# Patient Record
Sex: MALE | Race: BLACK OR AFRICAN AMERICAN | NOT HISPANIC OR LATINO | RURAL
[De-identification: names, ages, dates, MRNs, and addresses within clinical notes are randomized per-mention and may not be internally consistent; named-entity substitution may affect disease eponyms.]

---

## 2020-03-12 ENCOUNTER — HISTORICAL (OUTPATIENT)
Dept: ADMINISTRATIVE | Facility: HOSPITAL | Age: 45
End: 2020-03-12

## 2021-04-20 ENCOUNTER — HOSPITAL ENCOUNTER (INPATIENT)
Facility: HOSPITAL | Age: 46
LOS: 2 days | Discharge: HOME | DRG: 287 | End: 2021-04-22
Attending: EMERGENCY MEDICINE | Admitting: HOSPITALIST

## 2021-04-20 DIAGNOSIS — I50.9 NEW ONSET OF CONGESTIVE HEART FAILURE: ICD-10-CM

## 2021-04-20 DIAGNOSIS — I10 HYPERTENSION: ICD-10-CM

## 2021-04-20 DIAGNOSIS — I21.4 NSTEMI (NON-ST ELEVATED MYOCARDIAL INFARCTION): ICD-10-CM

## 2021-04-20 DIAGNOSIS — I50.9 CHF (CONGESTIVE HEART FAILURE): ICD-10-CM

## 2021-04-20 DIAGNOSIS — I10 UNCONTROLLED HYPERTENSION: ICD-10-CM

## 2021-04-20 DIAGNOSIS — I51.7 CARDIOMEGALY: ICD-10-CM

## 2021-04-20 DIAGNOSIS — R07.9 CHEST PAIN: ICD-10-CM

## 2021-04-20 DIAGNOSIS — R06.02 SHORTNESS OF BREATH: ICD-10-CM

## 2021-04-20 DIAGNOSIS — I50.9 ACUTE CONGESTIVE HEART FAILURE, UNSPECIFIED HEART FAILURE TYPE: Primary | ICD-10-CM

## 2021-04-20 LAB
ALBUMIN SERPL BCP-MCNC: 2.9 G/DL (ref 3.5–5)
ALBUMIN/GLOB SERPL: 0.7 {RATIO}
ALP SERPL-CCNC: 82 U/L (ref 45–115)
ALT SERPL W P-5'-P-CCNC: 51 U/L (ref 16–61)
AMPHET UR QL SCN: NEGATIVE
ANION GAP SERPL CALCULATED.3IONS-SCNC: 13 MMOL/L (ref 7–16)
AST SERPL W P-5'-P-CCNC: 31 U/L (ref 15–37)
BARBITURATES UR QL SCN: NEGATIVE
BASOPHILS # BLD AUTO: 0.12 K/UL (ref 0–0.2)
BASOPHILS NFR BLD AUTO: 0.9 % (ref 0–1)
BENZODIAZ METAB UR QL SCN: NEGATIVE
BILIRUB SERPL-MCNC: 0.5 MG/DL (ref 0–1.2)
BILIRUB UR QL STRIP: NEGATIVE
BUN SERPL-MCNC: 11 MG/DL (ref 7–18)
BUN/CREAT SERPL: 10 (ref 6–20)
CALCIUM SERPL-MCNC: 8.6 MG/DL (ref 8.5–10.1)
CANNABINOIDS UR QL SCN: POSITIVE
CHLORIDE SERPL-SCNC: 106 MMOL/L (ref 98–107)
CK MB SERPL-MCNC: 2.7 NG/ML (ref 1–3.6)
CK MB SERPL-MCNC: 2.9 NG/ML (ref 1–3.6)
CLARITY UR: CLEAR
CO2 SERPL-SCNC: 28 MMOL/L (ref 21–32)
COCAINE UR QL SCN: NEGATIVE
COLOR UR: ABNORMAL
CREAT SERPL-MCNC: 1.07 MG/DL (ref 0.7–1.3)
D DIMER PPP FEU-MCNC: 0.4 ΜG/ML (ref 0–0.47)
DIFFERENTIAL METHOD BLD: ABNORMAL
EOSINOPHIL # BLD AUTO: 0.17 K/UL (ref 0–0.5)
EOSINOPHIL NFR BLD AUTO: 1.3 % (ref 1–4)
ERYTHROCYTE [DISTWIDTH] IN BLOOD BY AUTOMATED COUNT: 14.4 % (ref 11.5–14.5)
FLUAV AG UPPER RESP QL IA.RAPID: NEGATIVE
FLUBV AG UPPER RESP QL IA.RAPID: NEGATIVE
GLOBULIN SER-MCNC: 4.2 G/DL (ref 2–4)
GLUCOSE SERPL-MCNC: 99 MG/DL (ref 74–106)
GLUCOSE UR STRIP-MCNC: NEGATIVE MG/DL
HCT VFR BLD AUTO: 37.2 % (ref 40–54)
HGB BLD-MCNC: 11.9 G/DL (ref 13.5–18)
IMM GRANULOCYTES # BLD AUTO: 0.06 K/UL (ref 0–0.04)
IMM GRANULOCYTES NFR BLD: 0.4 % (ref 0–0.4)
KETONES UR STRIP-SCNC: NEGATIVE MG/DL
LEUKOCYTE ESTERASE UR QL STRIP: NEGATIVE
LYMPHOCYTES # BLD AUTO: 2.18 K/UL (ref 1–4.8)
LYMPHOCYTES NFR BLD AUTO: 16.1 % (ref 27–41)
MCH RBC QN AUTO: 28.5 PG (ref 27–31)
MCHC RBC AUTO-ENTMCNC: 32 G/DL (ref 32–36)
MCV RBC AUTO: 89.2 FL (ref 80–96)
MONOCYTES # BLD AUTO: 1.16 K/UL (ref 0–0.8)
MONOCYTES NFR BLD AUTO: 8.6 % (ref 2–6)
MPC BLD CALC-MCNC: 11.8 FL (ref 9.4–12.4)
NEUTROPHILS # BLD AUTO: 9.84 K/UL (ref 1.8–7.7)
NEUTROPHILS NFR BLD AUTO: 72.7 % (ref 53–65)
NITRITE UR QL STRIP: NEGATIVE
NRBC # BLD AUTO: 0 X10E3/UL
NRBC, AUTO (.00): 0 %
NT-PROBNP SERPL-MCNC: 2953 PG/ML (ref 1–125)
OPIATES UR QL SCN: NEGATIVE
PCP UR QL SCN: NEGATIVE
PH UR STRIP: 7 PH UNITS
PLATELET # BLD AUTO: 329 K/UL (ref 150–400)
POTASSIUM SERPL-SCNC: 4.2 MMOL/L (ref 3.5–5.1)
PROT SERPL-MCNC: 7.1 G/DL (ref 6.4–8.2)
PROT UR QL STRIP: NEGATIVE
RBC # BLD AUTO: 4.17 M/UL (ref 4.6–6.2)
RBC # UR STRIP: ABNORMAL /UL
SARS-COV+SARS-COV-2 AG RESP QL IA.RAPID: NEGATIVE
SODIUM SERPL-SCNC: 143 MMOL/L (ref 136–145)
SP GR UR STRIP: 1.01
TROPONIN I SERPL-MCNC: 0.06 NG/ML
TROPONIN I SERPL-MCNC: 0.06 NG/ML
TROPONIN I SERPL-MCNC: 0.07 NG/ML
UROBILINOGEN UR STRIP-ACNC: 0.2 MG/DL
WBC # BLD AUTO: 13.53 K/UL (ref 4.5–11)

## 2021-04-20 PROCEDURE — 93010 ELECTROCARDIOGRAM REPORT: CPT | Mod: 76,,, | Performed by: INTERNAL MEDICINE

## 2021-04-20 PROCEDURE — 99285 EMERGENCY DEPT VISIT HI MDM: CPT | Mod: ,,, | Performed by: EMERGENCY MEDICINE

## 2021-04-20 PROCEDURE — 93010 EKG 12-LEAD: ICD-10-PCS | Mod: ,,, | Performed by: INTERNAL MEDICINE

## 2021-04-20 PROCEDURE — 63600175 PHARM REV CODE 636 W HCPCS: Performed by: EMERGENCY MEDICINE

## 2021-04-20 PROCEDURE — 96374 THER/PROPH/DIAG INJ IV PUSH: CPT

## 2021-04-20 PROCEDURE — 25000003 PHARM REV CODE 250: Performed by: NURSE PRACTITIONER

## 2021-04-20 PROCEDURE — 85025 COMPLETE CBC W/AUTO DIFF WBC: CPT | Performed by: EMERGENCY MEDICINE

## 2021-04-20 PROCEDURE — 93010 ELECTROCARDIOGRAM REPORT: CPT | Mod: ,,, | Performed by: INTERNAL MEDICINE

## 2021-04-20 PROCEDURE — 87428 SARSCOV & INF VIR A&B AG IA: CPT | Performed by: EMERGENCY MEDICINE

## 2021-04-20 PROCEDURE — 94761 N-INVAS EAR/PLS OXIMETRY MLT: CPT

## 2021-04-20 PROCEDURE — 99223 1ST HOSP IP/OBS HIGH 75: CPT | Mod: ,,, | Performed by: HOSPITALIST

## 2021-04-20 PROCEDURE — 99285 EMERGENCY DEPT VISIT HI MDM: CPT | Mod: 25

## 2021-04-20 PROCEDURE — 80307 DRUG TEST PRSMV CHEM ANLYZR: CPT | Performed by: EMERGENCY MEDICINE

## 2021-04-20 PROCEDURE — 81003 URINALYSIS AUTO W/O SCOPE: CPT | Performed by: EMERGENCY MEDICINE

## 2021-04-20 PROCEDURE — 93005 ELECTROCARDIOGRAM TRACING: CPT

## 2021-04-20 PROCEDURE — 99223 PR INITIAL HOSPITAL CARE,LEVL III: ICD-10-PCS | Mod: ,,, | Performed by: HOSPITALIST

## 2021-04-20 PROCEDURE — 82553 CREATINE MB FRACTION: CPT | Performed by: NURSE PRACTITIONER

## 2021-04-20 PROCEDURE — 84484 ASSAY OF TROPONIN QUANT: CPT | Performed by: EMERGENCY MEDICINE

## 2021-04-20 PROCEDURE — 84484 ASSAY OF TROPONIN QUANT: CPT | Performed by: NURSE PRACTITIONER

## 2021-04-20 PROCEDURE — 36415 COLL VENOUS BLD VENIPUNCTURE: CPT | Performed by: EMERGENCY MEDICINE

## 2021-04-20 PROCEDURE — 25000003 PHARM REV CODE 250: Performed by: EMERGENCY MEDICINE

## 2021-04-20 PROCEDURE — 25000003 PHARM REV CODE 250: Performed by: HOSPITALIST

## 2021-04-20 PROCEDURE — 99285 PR EMERGENCY DEPT VISIT,LEVEL V: ICD-10-PCS | Mod: ,,, | Performed by: EMERGENCY MEDICINE

## 2021-04-20 PROCEDURE — 99223 PR INITIAL HOSPITAL CARE,LEVL III: ICD-10-PCS | Mod: 25,,, | Performed by: INTERNAL MEDICINE

## 2021-04-20 PROCEDURE — 85378 FIBRIN DEGRADE SEMIQUANT: CPT | Performed by: EMERGENCY MEDICINE

## 2021-04-20 PROCEDURE — 11000001 HC ACUTE MED/SURG PRIVATE ROOM

## 2021-04-20 PROCEDURE — 83880 ASSAY OF NATRIURETIC PEPTIDE: CPT | Performed by: EMERGENCY MEDICINE

## 2021-04-20 PROCEDURE — 80053 COMPREHEN METABOLIC PANEL: CPT | Performed by: EMERGENCY MEDICINE

## 2021-04-20 PROCEDURE — 63600175 PHARM REV CODE 636 W HCPCS: Performed by: NURSE PRACTITIONER

## 2021-04-20 PROCEDURE — 25000242 PHARM REV CODE 250 ALT 637 W/ HCPCS: Performed by: EMERGENCY MEDICINE

## 2021-04-20 PROCEDURE — 99223 1ST HOSP IP/OBS HIGH 75: CPT | Mod: 25,,, | Performed by: INTERNAL MEDICINE

## 2021-04-20 PROCEDURE — 96376 TX/PRO/DX INJ SAME DRUG ADON: CPT

## 2021-04-20 RX ORDER — LISINOPRIL 10 MG/1
10 TABLET ORAL DAILY
Status: DISCONTINUED | OUTPATIENT
Start: 2021-04-21 | End: 2021-04-22

## 2021-04-20 RX ORDER — HYDRALAZINE HYDROCHLORIDE 25 MG/1
25 TABLET, FILM COATED ORAL EVERY 8 HOURS PRN
Status: DISCONTINUED | OUTPATIENT
Start: 2021-04-20 | End: 2021-04-22 | Stop reason: HOSPADM

## 2021-04-20 RX ORDER — NITROGLYCERIN 0.4 MG/1
0.4 TABLET SUBLINGUAL EVERY 5 MIN PRN
Status: DISCONTINUED | OUTPATIENT
Start: 2021-04-20 | End: 2021-04-22 | Stop reason: HOSPADM

## 2021-04-20 RX ORDER — AMLODIPINE BESYLATE 5 MG/1
5 TABLET ORAL DAILY
Status: DISCONTINUED | OUTPATIENT
Start: 2021-04-20 | End: 2021-04-21

## 2021-04-20 RX ORDER — SODIUM CHLORIDE 0.9 % (FLUSH) 0.9 %
10 SYRINGE (ML) INJECTION
Status: DISCONTINUED | OUTPATIENT
Start: 2021-04-20 | End: 2021-04-22 | Stop reason: HOSPADM

## 2021-04-20 RX ORDER — PANTOPRAZOLE SODIUM 40 MG/1
40 TABLET, DELAYED RELEASE ORAL DAILY
Status: DISCONTINUED | OUTPATIENT
Start: 2021-04-20 | End: 2021-04-22 | Stop reason: HOSPADM

## 2021-04-20 RX ORDER — ASPIRIN 325 MG
325 TABLET ORAL DAILY
Status: DISCONTINUED | OUTPATIENT
Start: 2021-04-20 | End: 2021-04-22 | Stop reason: HOSPADM

## 2021-04-20 RX ORDER — LISINOPRIL 5 MG/1
5 TABLET ORAL DAILY
Status: DISCONTINUED | OUTPATIENT
Start: 2021-04-20 | End: 2021-04-20

## 2021-04-20 RX ORDER — POTASSIUM CHLORIDE 750 MG/1
10 TABLET, EXTENDED RELEASE ORAL DAILY
Status: DISCONTINUED | OUTPATIENT
Start: 2021-04-20 | End: 2021-04-22 | Stop reason: HOSPADM

## 2021-04-20 RX ORDER — FUROSEMIDE 10 MG/ML
40 INJECTION INTRAMUSCULAR; INTRAVENOUS
Status: DISCONTINUED | OUTPATIENT
Start: 2021-04-20 | End: 2021-04-22

## 2021-04-20 RX ORDER — ATORVASTATIN CALCIUM 20 MG/1
20 TABLET, FILM COATED ORAL NIGHTLY
Status: DISCONTINUED | OUTPATIENT
Start: 2021-04-20 | End: 2021-04-21

## 2021-04-20 RX ORDER — FUROSEMIDE 10 MG/ML
60 INJECTION INTRAMUSCULAR; INTRAVENOUS
Status: COMPLETED | OUTPATIENT
Start: 2021-04-20 | End: 2021-04-20

## 2021-04-20 RX ORDER — CARVEDILOL 6.25 MG/1
6.25 TABLET ORAL 2 TIMES DAILY
Status: DISCONTINUED | OUTPATIENT
Start: 2021-04-20 | End: 2021-04-22

## 2021-04-20 RX ADMIN — AMLODIPINE BESYLATE 5 MG: 5 TABLET ORAL at 03:04

## 2021-04-20 RX ADMIN — CARVEDILOL 6.25 MG: 6.25 TABLET, FILM COATED ORAL at 03:04

## 2021-04-20 RX ADMIN — HYDRALAZINE HYDROCHLORIDE 25 MG: 25 TABLET, FILM COATED ORAL at 06:04

## 2021-04-20 RX ADMIN — CARVEDILOL 6.25 MG: 6.25 TABLET, FILM COATED ORAL at 09:04

## 2021-04-20 RX ADMIN — FUROSEMIDE 40 MG: 10 INJECTION, SOLUTION INTRAVENOUS at 03:04

## 2021-04-20 RX ADMIN — NITROGLYCERIN 0.4 MG: 0.4 TABLET SUBLINGUAL at 10:04

## 2021-04-20 RX ADMIN — ATORVASTATIN CALCIUM 20 MG: 20 TABLET, FILM COATED ORAL at 09:04

## 2021-04-20 RX ADMIN — NITROGLYCERIN 0.4 MG: 0.4 TABLET SUBLINGUAL at 11:04

## 2021-04-20 RX ADMIN — PANTOPRAZOLE SODIUM 40 MG: 40 TABLET, DELAYED RELEASE ORAL at 03:04

## 2021-04-20 RX ADMIN — FUROSEMIDE 60 MG: 10 INJECTION, SOLUTION INTRAVENOUS at 12:04

## 2021-04-20 RX ADMIN — NITROGLYCERIN 1 INCH: 20 OINTMENT TOPICAL at 12:04

## 2021-04-20 RX ADMIN — POTASSIUM CHLORIDE 10 MEQ: 750 TABLET, FILM COATED, EXTENDED RELEASE ORAL at 05:04

## 2021-04-20 RX ADMIN — ASPIRIN 325 MG ORAL TABLET 325 MG: 325 PILL ORAL at 12:04

## 2021-04-21 PROBLEM — Z79.899 DVT PROPHYLAXIS: Status: ACTIVE | Noted: 2021-04-21

## 2021-04-21 PROBLEM — G47.33 OSA (OBSTRUCTIVE SLEEP APNEA): Status: ACTIVE | Noted: 2021-04-21

## 2021-04-21 PROBLEM — Z72.0 TOBACCO USE: Status: ACTIVE | Noted: 2021-04-21

## 2021-04-21 LAB
ANION GAP SERPL CALCULATED.3IONS-SCNC: 11 MMOL/L (ref 7–16)
AV INDEX (PROSTH): 0.51
AV VALVE AREA: 2.33 CM2
BASOPHILS # BLD AUTO: 0.12 K/UL (ref 0–0.2)
BASOPHILS NFR BLD AUTO: 0.9 % (ref 0–1)
BSA FOR ECHO PROCEDURE: 2.18 M2
BUN SERPL-MCNC: 14 MG/DL (ref 7–18)
BUN/CREAT SERPL: 11 (ref 6–20)
CALCIUM SERPL-MCNC: 8.6 MG/DL (ref 8.5–10.1)
CHLORIDE SERPL-SCNC: 104 MMOL/L (ref 98–107)
CHOLEST SERPL-MCNC: 123 MG/DL (ref 0–200)
CHOLEST/HDLC SERPL: 3.4 {RATIO}
CO2 SERPL-SCNC: 30 MMOL/L (ref 21–32)
CREAT SERPL-MCNC: 1.24 MG/DL (ref 0.7–1.3)
CV ECHO LV RWT: 0.41 CM
DIFFERENTIAL METHOD BLD: ABNORMAL
DOP CALC AO VTI: 13.36 CM
DOP CALC LVOT AREA: 4.5 CM2
DOP CALC LVOT DIAMETER: 2.4 CM
DOP CALC LVOT STROKE VOLUME: 31.11 CM3
DOP CALCLVOT PEAK VEL VTI: 6.88 CM
ECHO LV POSTERIOR WALL: 1.4 CM (ref 0.6–1.1)
EJECTION FRACTION: 20 %
EOSINOPHIL # BLD AUTO: 0.35 K/UL (ref 0–0.5)
EOSINOPHIL NFR BLD AUTO: 2.6 % (ref 1–4)
ERYTHROCYTE [DISTWIDTH] IN BLOOD BY AUTOMATED COUNT: 14.3 % (ref 11.5–14.5)
EST. AVERAGE GLUCOSE BLD GHB EST-MCNC: 97 MG/DL
FRACTIONAL SHORTENING: 4 % (ref 28–44)
GLUCOSE SERPL-MCNC: 92 MG/DL (ref 74–106)
HBA1C MFR BLD HPLC: 5.5 % (ref 4.5–6.6)
HCT VFR BLD AUTO: 38.4 % (ref 40–54)
HDLC SERPL-MCNC: 36 MG/DL (ref 40–60)
HGB BLD-MCNC: 12.4 G/DL (ref 13.5–18)
IMM GRANULOCYTES # BLD AUTO: 0.05 K/UL (ref 0–0.04)
IMM GRANULOCYTES NFR BLD: 0.4 % (ref 0–0.4)
INTERVENTRICULAR SEPTUM: 1 CM (ref 0.6–1.1)
IVC DIAMETER: 2.4 CM
LDLC SERPL CALC-MCNC: 70 MG/DL
LDLC/HDLC SERPL: 1.9 {RATIO}
LEFT ATRIUM SIZE: 5.4 CM
LEFT INTERNAL DIMENSION IN SYSTOLE: 6.5 CM (ref 2.1–4)
LEFT VENTRICLE MASS INDEX: 184 G/M2
LEFT VENTRICULAR INTERNAL DIMENSION IN DIASTOLE: 6.8 CM (ref 3.5–6)
LEFT VENTRICULAR MASS: 386.86 G
LYMPHOCYTES # BLD AUTO: 2.82 K/UL (ref 1–4.8)
LYMPHOCYTES NFR BLD AUTO: 20.9 % (ref 27–41)
MCH RBC QN AUTO: 28.7 PG (ref 27–31)
MCHC RBC AUTO-ENTMCNC: 32.3 G/DL (ref 32–36)
MCV RBC AUTO: 88.9 FL (ref 80–96)
MONOCYTES # BLD AUTO: 1.24 K/UL (ref 0–0.8)
MONOCYTES NFR BLD AUTO: 9.2 % (ref 2–6)
MPC BLD CALC-MCNC: 12.9 FL (ref 9.4–12.4)
NEUTROPHILS # BLD AUTO: 8.89 K/UL (ref 1.8–7.7)
NEUTROPHILS NFR BLD AUTO: 66 % (ref 53–65)
NONHDLC SERPL-MCNC: 87 MG/DL
NRBC # BLD AUTO: 0 X10E3/UL
NRBC, AUTO (.00): 0 %
PLATELET # BLD AUTO: 330 K/UL (ref 150–400)
POTASSIUM SERPL-SCNC: 4 MMOL/L (ref 3.5–5.1)
RA PRESSURE: 15 MMHG
RBC # BLD AUTO: 4.32 M/UL (ref 4.6–6.2)
SODIUM SERPL-SCNC: 141 MMOL/L (ref 136–145)
TRIGL SERPL-MCNC: 84 MG/DL (ref 35–150)
VLDLC SERPL-MCNC: 17 MG/DL
WBC # BLD AUTO: 13.47 K/UL (ref 4.5–11)

## 2021-04-21 PROCEDURE — 99153 MOD SED SAME PHYS/QHP EA: CPT | Performed by: STUDENT IN AN ORGANIZED HEALTH CARE EDUCATION/TRAINING PROGRAM

## 2021-04-21 PROCEDURE — 93458 L HRT ARTERY/VENTRICLE ANGIO: CPT | Mod: 26,,, | Performed by: STUDENT IN AN ORGANIZED HEALTH CARE EDUCATION/TRAINING PROGRAM

## 2021-04-21 PROCEDURE — 27100168 OPTIME MED/SURG SUP & DEVICES NON-STERILE SUPPLY: Performed by: STUDENT IN AN ORGANIZED HEALTH CARE EDUCATION/TRAINING PROGRAM

## 2021-04-21 PROCEDURE — 83036 HEMOGLOBIN GLYCOSYLATED A1C: CPT | Performed by: NURSE PRACTITIONER

## 2021-04-21 PROCEDURE — 94761 N-INVAS EAR/PLS OXIMETRY MLT: CPT

## 2021-04-21 PROCEDURE — C1887 CATHETER, GUIDING: HCPCS | Performed by: STUDENT IN AN ORGANIZED HEALTH CARE EDUCATION/TRAINING PROGRAM

## 2021-04-21 PROCEDURE — 27000080 OPTIME MED/SURG SUP & DEVICES GENERAL CLASSIFICATION: Performed by: STUDENT IN AN ORGANIZED HEALTH CARE EDUCATION/TRAINING PROGRAM

## 2021-04-21 PROCEDURE — 93005 ELECTROCARDIOGRAM TRACING: CPT

## 2021-04-21 PROCEDURE — 80048 BASIC METABOLIC PNL TOTAL CA: CPT | Performed by: NURSE PRACTITIONER

## 2021-04-21 PROCEDURE — 25000003 PHARM REV CODE 250: Performed by: HOSPITALIST

## 2021-04-21 PROCEDURE — 63600175 PHARM REV CODE 636 W HCPCS: Performed by: STUDENT IN AN ORGANIZED HEALTH CARE EDUCATION/TRAINING PROGRAM

## 2021-04-21 PROCEDURE — C1769 GUIDE WIRE: HCPCS | Performed by: STUDENT IN AN ORGANIZED HEALTH CARE EDUCATION/TRAINING PROGRAM

## 2021-04-21 PROCEDURE — 99152 MOD SED SAME PHYS/QHP 5/>YRS: CPT | Performed by: STUDENT IN AN ORGANIZED HEALTH CARE EDUCATION/TRAINING PROGRAM

## 2021-04-21 PROCEDURE — 27201423 OPTIME MED/SURG SUP & DEVICES STERILE SUPPLY: Performed by: STUDENT IN AN ORGANIZED HEALTH CARE EDUCATION/TRAINING PROGRAM

## 2021-04-21 PROCEDURE — 93010 ELECTROCARDIOGRAM REPORT: CPT | Mod: ,,, | Performed by: INTERNAL MEDICINE

## 2021-04-21 PROCEDURE — 25000003 PHARM REV CODE 250: Performed by: STUDENT IN AN ORGANIZED HEALTH CARE EDUCATION/TRAINING PROGRAM

## 2021-04-21 PROCEDURE — 63600175 PHARM REV CODE 636 W HCPCS: Performed by: NURSE PRACTITIONER

## 2021-04-21 PROCEDURE — 80061 LIPID PANEL: CPT | Performed by: NURSE PRACTITIONER

## 2021-04-21 PROCEDURE — 36415 COLL VENOUS BLD VENIPUNCTURE: CPT | Performed by: NURSE PRACTITIONER

## 2021-04-21 PROCEDURE — 25000003 PHARM REV CODE 250: Performed by: NURSE PRACTITIONER

## 2021-04-21 PROCEDURE — C1894 INTRO/SHEATH, NON-LASER: HCPCS | Performed by: STUDENT IN AN ORGANIZED HEALTH CARE EDUCATION/TRAINING PROGRAM

## 2021-04-21 PROCEDURE — 93458 PR CATH PLACE/CORON ANGIO, IMG SUPER/INTERP,W LEFT HEART VENTRICULOGRAPHY: ICD-10-PCS | Mod: 26,,, | Performed by: STUDENT IN AN ORGANIZED HEALTH CARE EDUCATION/TRAINING PROGRAM

## 2021-04-21 PROCEDURE — 93010 EKG 12-LEAD: ICD-10-PCS | Mod: ,,, | Performed by: INTERNAL MEDICINE

## 2021-04-21 PROCEDURE — 27800903 OPTIME MED/SURG SUP & DEVICES OTHER IMPLANTS: Performed by: STUDENT IN AN ORGANIZED HEALTH CARE EDUCATION/TRAINING PROGRAM

## 2021-04-21 PROCEDURE — 25500020 PHARM REV CODE 255: Performed by: STUDENT IN AN ORGANIZED HEALTH CARE EDUCATION/TRAINING PROGRAM

## 2021-04-21 PROCEDURE — 85025 COMPLETE CBC W/AUTO DIFF WBC: CPT | Performed by: NURSE PRACTITIONER

## 2021-04-21 PROCEDURE — 11000001 HC ACUTE MED/SURG PRIVATE ROOM

## 2021-04-21 PROCEDURE — 93458 L HRT ARTERY/VENTRICLE ANGIO: CPT | Performed by: STUDENT IN AN ORGANIZED HEALTH CARE EDUCATION/TRAINING PROGRAM

## 2021-04-21 RX ORDER — ACETAMINOPHEN 325 MG/1
650 TABLET ORAL EVERY 4 HOURS PRN
Status: DISCONTINUED | OUTPATIENT
Start: 2021-04-21 | End: 2021-04-22 | Stop reason: HOSPADM

## 2021-04-21 RX ORDER — HEPARIN SOD,PORCINE/0.9 % NACL 1000/500ML
INTRAVENOUS SOLUTION INTRAVENOUS
Status: DISCONTINUED | OUTPATIENT
Start: 2021-04-21 | End: 2021-04-21 | Stop reason: HOSPADM

## 2021-04-21 RX ORDER — LIDOCAINE HYDROCHLORIDE 10 MG/ML
INJECTION, SOLUTION EPIDURAL; INFILTRATION; INTRACAUDAL; PERINEURAL
Status: DISCONTINUED | OUTPATIENT
Start: 2021-04-21 | End: 2021-04-21 | Stop reason: HOSPADM

## 2021-04-21 RX ORDER — DIPHENHYDRAMINE HYDROCHLORIDE 50 MG/ML
INJECTION INTRAMUSCULAR; INTRAVENOUS
Status: DISCONTINUED | OUTPATIENT
Start: 2021-04-21 | End: 2021-04-21 | Stop reason: HOSPADM

## 2021-04-21 RX ORDER — FENTANYL CITRATE 50 UG/ML
INJECTION, SOLUTION INTRAMUSCULAR; INTRAVENOUS
Status: DISCONTINUED | OUTPATIENT
Start: 2021-04-21 | End: 2021-04-21 | Stop reason: HOSPADM

## 2021-04-21 RX ORDER — HYDROCODONE BITARTRATE AND ACETAMINOPHEN 5; 325 MG/1; MG/1
1 TABLET ORAL EVERY 4 HOURS PRN
Status: DISCONTINUED | OUTPATIENT
Start: 2021-04-21 | End: 2021-04-22 | Stop reason: HOSPADM

## 2021-04-21 RX ORDER — AMLODIPINE BESYLATE 10 MG/1
10 TABLET ORAL DAILY
Status: DISCONTINUED | OUTPATIENT
Start: 2021-04-22 | End: 2021-04-22

## 2021-04-21 RX ORDER — ATORVASTATIN CALCIUM 40 MG/1
40 TABLET, FILM COATED ORAL NIGHTLY
Status: DISCONTINUED | OUTPATIENT
Start: 2021-04-21 | End: 2021-04-22 | Stop reason: HOSPADM

## 2021-04-21 RX ORDER — METHYLPREDNISOLONE SOD SUCC 125 MG
VIAL (EA) INJECTION
Status: DISCONTINUED | OUTPATIENT
Start: 2021-04-21 | End: 2021-04-21 | Stop reason: HOSPADM

## 2021-04-21 RX ORDER — MIDAZOLAM HYDROCHLORIDE 1 MG/ML
INJECTION INTRAMUSCULAR; INTRAVENOUS
Status: DISCONTINUED | OUTPATIENT
Start: 2021-04-21 | End: 2021-04-21 | Stop reason: HOSPADM

## 2021-04-21 RX ORDER — SODIUM CHLORIDE 450 MG/100ML
INJECTION, SOLUTION INTRAVENOUS
Status: DISCONTINUED | OUTPATIENT
Start: 2021-04-21 | End: 2021-04-22 | Stop reason: HOSPADM

## 2021-04-21 RX ORDER — NITROGLYCERIN 5 MG/ML
INJECTION, SOLUTION INTRAVENOUS
Status: DISCONTINUED | OUTPATIENT
Start: 2021-04-21 | End: 2021-04-21 | Stop reason: HOSPADM

## 2021-04-21 RX ORDER — ONDANSETRON 4 MG/1
8 TABLET, ORALLY DISINTEGRATING ORAL EVERY 8 HOURS PRN
Status: DISCONTINUED | OUTPATIENT
Start: 2021-04-21 | End: 2021-04-22 | Stop reason: HOSPADM

## 2021-04-21 RX ADMIN — ATORVASTATIN CALCIUM 40 MG: 40 TABLET, FILM COATED ORAL at 09:04

## 2021-04-21 RX ADMIN — LISINOPRIL 10 MG: 10 TABLET ORAL at 09:04

## 2021-04-21 RX ADMIN — PANTOPRAZOLE SODIUM 40 MG: 40 TABLET, DELAYED RELEASE ORAL at 09:04

## 2021-04-21 RX ADMIN — FUROSEMIDE 40 MG: 10 INJECTION, SOLUTION INTRAVENOUS at 03:04

## 2021-04-21 RX ADMIN — HYDRALAZINE HYDROCHLORIDE 25 MG: 25 TABLET, FILM COATED ORAL at 11:04

## 2021-04-21 RX ADMIN — AMLODIPINE BESYLATE 5 MG: 5 TABLET ORAL at 09:04

## 2021-04-21 RX ADMIN — POTASSIUM CHLORIDE 10 MEQ: 750 TABLET, FILM COATED, EXTENDED RELEASE ORAL at 09:04

## 2021-04-21 RX ADMIN — ASPIRIN 325 MG ORAL TABLET 325 MG: 325 PILL ORAL at 09:04

## 2021-04-21 RX ADMIN — CARVEDILOL 6.25 MG: 6.25 TABLET, FILM COATED ORAL at 09:04

## 2021-04-22 VITALS
HEART RATE: 98 BPM | WEIGHT: 214.5 LBS | RESPIRATION RATE: 20 BRPM | OXYGEN SATURATION: 98 % | BODY MASS INDEX: 34.47 KG/M2 | HEIGHT: 66 IN | DIASTOLIC BLOOD PRESSURE: 99 MMHG | SYSTOLIC BLOOD PRESSURE: 136 MMHG | TEMPERATURE: 99 F

## 2021-04-22 PROBLEM — Z72.0 TOBACCO USE: Status: RESOLVED | Noted: 2021-04-21 | Resolved: 2021-04-22

## 2021-04-22 PROBLEM — Z79.899 DVT PROPHYLAXIS: Status: RESOLVED | Noted: 2021-04-21 | Resolved: 2021-04-22

## 2021-04-22 PROBLEM — I42.0 DILATED CARDIOMYOPATHY: Status: ACTIVE | Noted: 2021-04-22

## 2021-04-22 LAB
ANION GAP SERPL CALCULATED.3IONS-SCNC: 14 MMOL/L (ref 7–16)
BASOPHILS # BLD AUTO: 0.03 K/UL (ref 0–0.2)
BASOPHILS NFR BLD AUTO: 0.2 % (ref 0–1)
BUN SERPL-MCNC: 18 MG/DL (ref 7–18)
BUN/CREAT SERPL: 16 (ref 6–20)
CALCIUM SERPL-MCNC: 9 MG/DL (ref 8.5–10.1)
CHLORIDE SERPL-SCNC: 104 MMOL/L (ref 98–107)
CO2 SERPL-SCNC: 29 MMOL/L (ref 21–32)
CREAT SERPL-MCNC: 1.11 MG/DL (ref 0.7–1.3)
DIFFERENTIAL METHOD BLD: ABNORMAL
EOSINOPHIL # BLD AUTO: 0 K/UL (ref 0–0.5)
EOSINOPHIL NFR BLD AUTO: 0 % (ref 1–4)
ERYTHROCYTE [DISTWIDTH] IN BLOOD BY AUTOMATED COUNT: 14.3 % (ref 11.5–14.5)
GLUCOSE SERPL-MCNC: 116 MG/DL (ref 74–106)
HCT VFR BLD AUTO: 40.7 % (ref 40–54)
HGB BLD-MCNC: 13.3 G/DL (ref 13.5–18)
HIV 1+O+2 AB SERPL QL: NORMAL
IMM GRANULOCYTES # BLD AUTO: 0.11 K/UL (ref 0–0.04)
IMM GRANULOCYTES NFR BLD: 0.6 % (ref 0–0.4)
LYMPHOCYTES # BLD AUTO: 1.27 K/UL (ref 1–4.8)
LYMPHOCYTES NFR BLD AUTO: 7.1 % (ref 27–41)
MCH RBC QN AUTO: 28.2 PG (ref 27–31)
MCHC RBC AUTO-ENTMCNC: 32.7 G/DL (ref 32–36)
MCV RBC AUTO: 86.4 FL (ref 80–96)
MONOCYTES # BLD AUTO: 1.06 K/UL (ref 0–0.8)
MONOCYTES NFR BLD AUTO: 5.9 % (ref 2–6)
MPC BLD CALC-MCNC: 12.7 FL (ref 9.4–12.4)
NEUTROPHILS # BLD AUTO: 15.37 K/UL (ref 1.8–7.7)
NEUTROPHILS NFR BLD AUTO: 86.2 % (ref 53–65)
NRBC # BLD AUTO: 0 X10E3/UL
NRBC, AUTO (.00): 0 %
PLATELET # BLD AUTO: 374 K/UL (ref 150–400)
POTASSIUM SERPL-SCNC: 3.8 MMOL/L (ref 3.5–5.1)
RBC # BLD AUTO: 4.71 M/UL (ref 4.6–6.2)
SODIUM SERPL-SCNC: 143 MMOL/L (ref 136–145)
T4 SERPL-MCNC: 8.4 ΜG/DL (ref 4.5–12.1)
TSH SERPL DL<=0.005 MIU/L-ACNC: 0.42 UIU/ML (ref 0.36–3.74)
WBC # BLD AUTO: 17.84 K/UL (ref 4.5–11)

## 2021-04-22 PROCEDURE — 85025 COMPLETE CBC W/AUTO DIFF WBC: CPT | Performed by: HOSPITALIST

## 2021-04-22 PROCEDURE — 80048 BASIC METABOLIC PNL TOTAL CA: CPT | Performed by: HOSPITALIST

## 2021-04-22 PROCEDURE — 99232 PR SUBSEQUENT HOSPITAL CARE,LEVL II: ICD-10-PCS | Mod: ,,, | Performed by: INTERNAL MEDICINE

## 2021-04-22 PROCEDURE — 25000003 PHARM REV CODE 250: Performed by: INTERNAL MEDICINE

## 2021-04-22 PROCEDURE — 25000003 PHARM REV CODE 250: Performed by: HOSPITALIST

## 2021-04-22 PROCEDURE — 25000003 PHARM REV CODE 250: Performed by: NURSE PRACTITIONER

## 2021-04-22 PROCEDURE — 84443 ASSAY THYROID STIM HORMONE: CPT | Performed by: HOSPITALIST

## 2021-04-22 PROCEDURE — 94761 N-INVAS EAR/PLS OXIMETRY MLT: CPT

## 2021-04-22 PROCEDURE — 84436 ASSAY OF TOTAL THYROXINE: CPT | Performed by: HOSPITALIST

## 2021-04-22 PROCEDURE — 63600175 PHARM REV CODE 636 W HCPCS: Performed by: NURSE PRACTITIONER

## 2021-04-22 PROCEDURE — 86703 HIV-1/HIV-2 1 RESULT ANTBDY: CPT | Performed by: HOSPITALIST

## 2021-04-22 PROCEDURE — 99232 SBSQ HOSP IP/OBS MODERATE 35: CPT | Mod: ,,, | Performed by: INTERNAL MEDICINE

## 2021-04-22 PROCEDURE — 36415 COLL VENOUS BLD VENIPUNCTURE: CPT | Performed by: HOSPITALIST

## 2021-04-22 RX ORDER — POTASSIUM CHLORIDE 750 MG/1
10 TABLET, EXTENDED RELEASE ORAL DAILY
Qty: 30 TABLET | Refills: 2 | Status: SHIPPED | OUTPATIENT
Start: 2021-04-23

## 2021-04-22 RX ORDER — AMLODIPINE BESYLATE 5 MG/1
5 TABLET ORAL DAILY
Qty: 30 TABLET | Refills: 2 | Status: SHIPPED | OUTPATIENT
Start: 2021-04-22 | End: 2024-03-25 | Stop reason: SDUPTHER

## 2021-04-22 RX ORDER — CARVEDILOL 12.5 MG/1
12.5 TABLET ORAL 2 TIMES DAILY
Qty: 60 TABLET | Refills: 2 | Status: SHIPPED | OUTPATIENT
Start: 2021-04-22 | End: 2024-03-25 | Stop reason: SDUPTHER

## 2021-04-22 RX ORDER — ASPIRIN 325 MG
325 TABLET ORAL DAILY
Qty: 30 TABLET | Refills: 2 | Status: SHIPPED | OUTPATIENT
Start: 2021-04-23 | End: 2024-03-25 | Stop reason: SDUPTHER

## 2021-04-22 RX ORDER — SPIRONOLACTONE 25 MG/1
25 TABLET ORAL DAILY
Status: DISCONTINUED | OUTPATIENT
Start: 2021-04-22 | End: 2021-04-22 | Stop reason: HOSPADM

## 2021-04-22 RX ORDER — LISINOPRIL 20 MG/1
20 TABLET ORAL DAILY
Qty: 30 TABLET | Refills: 2 | Status: SHIPPED | OUTPATIENT
Start: 2021-04-23 | End: 2024-03-25 | Stop reason: SDUPTHER

## 2021-04-22 RX ORDER — FUROSEMIDE 40 MG/1
40 TABLET ORAL DAILY
Status: DISCONTINUED | OUTPATIENT
Start: 2021-04-22 | End: 2021-04-22 | Stop reason: HOSPADM

## 2021-04-22 RX ORDER — CARVEDILOL 12.5 MG/1
12.5 TABLET ORAL 2 TIMES DAILY
Status: DISCONTINUED | OUTPATIENT
Start: 2021-04-22 | End: 2021-04-22 | Stop reason: HOSPADM

## 2021-04-22 RX ORDER — LISINOPRIL 20 MG/1
20 TABLET ORAL DAILY
Status: DISCONTINUED | OUTPATIENT
Start: 2021-04-23 | End: 2021-04-22 | Stop reason: HOSPADM

## 2021-04-22 RX ORDER — SPIRONOLACTONE 25 MG/1
25 TABLET ORAL DAILY
Qty: 30 TABLET | Refills: 2 | Status: SHIPPED | OUTPATIENT
Start: 2021-04-22 | End: 2022-04-22

## 2021-04-22 RX ORDER — FUROSEMIDE 40 MG/1
40 TABLET ORAL DAILY
Qty: 30 TABLET | Refills: 2 | Status: SHIPPED | OUTPATIENT
Start: 2021-04-22 | End: 2022-04-22

## 2021-04-22 RX ORDER — AMLODIPINE BESYLATE 5 MG/1
5 TABLET ORAL DAILY
Status: DISCONTINUED | OUTPATIENT
Start: 2021-04-22 | End: 2021-04-22 | Stop reason: HOSPADM

## 2021-04-22 RX ADMIN — POTASSIUM CHLORIDE 10 MEQ: 750 TABLET, FILM COATED, EXTENDED RELEASE ORAL at 08:04

## 2021-04-22 RX ADMIN — FUROSEMIDE 40 MG: 10 INJECTION, SOLUTION INTRAVENOUS at 02:04

## 2021-04-22 RX ADMIN — AMLODIPINE BESYLATE 5 MG: 5 TABLET ORAL at 11:04

## 2021-04-22 RX ADMIN — CARVEDILOL 6.25 MG: 6.25 TABLET, FILM COATED ORAL at 08:04

## 2021-04-22 RX ADMIN — AMLODIPINE BESYLATE 10 MG: 10 TABLET ORAL at 08:04

## 2021-04-22 RX ADMIN — FUROSEMIDE 40 MG: 40 TABLET ORAL at 11:04

## 2021-04-22 RX ADMIN — PANTOPRAZOLE SODIUM 40 MG: 40 TABLET, DELAYED RELEASE ORAL at 08:04

## 2021-04-22 RX ADMIN — LISINOPRIL 10 MG: 10 TABLET ORAL at 08:04

## 2021-04-22 RX ADMIN — SPIRONOLACTONE 25 MG: 25 TABLET, FILM COATED ORAL at 11:04

## 2021-04-22 RX ADMIN — ASPIRIN 325 MG ORAL TABLET 325 MG: 325 PILL ORAL at 08:04

## 2023-07-20 ENCOUNTER — HOSPITAL ENCOUNTER (EMERGENCY)
Facility: HOSPITAL | Age: 48
Discharge: HOME | End: 2023-07-20
Attending: EMERGENCY MEDICINE

## 2023-07-20 VITALS
HEIGHT: 66 IN | OXYGEN SATURATION: 97 % | TEMPERATURE: 99 F | BODY MASS INDEX: 40.98 KG/M2 | RESPIRATION RATE: 18 BRPM | HEART RATE: 103 BPM | SYSTOLIC BLOOD PRESSURE: 181 MMHG | WEIGHT: 255 LBS | DIASTOLIC BLOOD PRESSURE: 103 MMHG

## 2023-07-20 DIAGNOSIS — R52 PAIN: ICD-10-CM

## 2023-07-20 DIAGNOSIS — F12.10 MARIJUANA ABUSE: ICD-10-CM

## 2023-07-20 DIAGNOSIS — M43.17 SPONDYLOLISTHESIS AT L5-S1 LEVEL: Primary | ICD-10-CM

## 2023-07-20 DIAGNOSIS — F15.10 METHAMPHETAMINE ABUSE: ICD-10-CM

## 2023-07-20 LAB
ALBUMIN SERPL BCP-MCNC: 3.5 G/DL (ref 3.5–5)
ALBUMIN/GLOB SERPL: 0.7 {RATIO}
ALP SERPL-CCNC: 89 U/L (ref 45–115)
ALT SERPL W P-5'-P-CCNC: 18 U/L (ref 16–61)
AMPHET UR QL SCN: POSITIVE
ANION GAP SERPL CALCULATED.3IONS-SCNC: 8 MMOL/L (ref 7–16)
AST SERPL W P-5'-P-CCNC: 14 U/L (ref 15–37)
BACTERIA #/AREA URNS HPF: ABNORMAL /HPF
BARBITURATES UR QL SCN: NEGATIVE
BASOPHILS # BLD AUTO: 0.09 K/UL (ref 0–0.2)
BASOPHILS NFR BLD AUTO: 0.8 % (ref 0–1)
BENZODIAZ METAB UR QL SCN: NEGATIVE
BILIRUB SERPL-MCNC: 0.5 MG/DL (ref ?–1.2)
BILIRUB UR QL STRIP: NEGATIVE
BUN SERPL-MCNC: 11 MG/DL (ref 7–18)
BUN/CREAT SERPL: 10 (ref 6–20)
CALCIUM SERPL-MCNC: 9.3 MG/DL (ref 8.5–10.1)
CANNABINOIDS UR QL SCN: POSITIVE
CHLORIDE SERPL-SCNC: 101 MMOL/L (ref 98–107)
CLARITY UR: CLEAR
CO2 SERPL-SCNC: 32 MMOL/L (ref 21–32)
COCAINE UR QL SCN: NEGATIVE
COLOR UR: YELLOW
CREAT SERPL-MCNC: 1.11 MG/DL (ref 0.7–1.3)
DIFFERENTIAL METHOD BLD: ABNORMAL
EGFR (NO RACE VARIABLE) (RUSH/TITUS): 82 ML/MIN/1.73M2
EOSINOPHIL # BLD AUTO: 0.32 K/UL (ref 0–0.5)
EOSINOPHIL NFR BLD AUTO: 3 % (ref 1–4)
EOSINOPHIL NFR BLD MANUAL: 2 % (ref 1–4)
ERYTHROCYTE [DISTWIDTH] IN BLOOD BY AUTOMATED COUNT: 14.8 % (ref 11.5–14.5)
GLOBULIN SER-MCNC: 4.9 G/DL (ref 2–4)
GLUCOSE SERPL-MCNC: 96 MG/DL (ref 74–106)
GLUCOSE UR STRIP-MCNC: NORMAL MG/DL
HCT VFR BLD AUTO: 41.9 % (ref 40–54)
HGB BLD-MCNC: 13.9 G/DL (ref 13.5–18)
IMM GRANULOCYTES # BLD AUTO: 0.03 K/UL (ref 0–0.04)
IMM GRANULOCYTES NFR BLD: 0.3 % (ref 0–0.4)
KETONES UR STRIP-SCNC: NEGATIVE MG/DL
LEUKOCYTE ESTERASE UR QL STRIP: ABNORMAL
LYMPHOCYTES # BLD AUTO: 3.38 K/UL (ref 1–4.8)
LYMPHOCYTES NFR BLD AUTO: 31.2 % (ref 27–41)
LYMPHOCYTES NFR BLD MANUAL: 35 % (ref 27–41)
MAGNESIUM SERPL-MCNC: 2 MG/DL (ref 1.7–2.3)
MCH RBC QN AUTO: 28.2 PG (ref 27–31)
MCHC RBC AUTO-ENTMCNC: 33.2 G/DL (ref 32–36)
MCV RBC AUTO: 85 FL (ref 80–96)
METAMYELOCYTES NFR BLD MANUAL: 1 %
MONOCYTES # BLD AUTO: 0.96 K/UL (ref 0–0.8)
MONOCYTES NFR BLD AUTO: 8.9 % (ref 2–6)
MONOCYTES NFR BLD MANUAL: 6 % (ref 2–6)
MPC BLD CALC-MCNC: 11.7 FL (ref 9.4–12.4)
MUCOUS THREADS #/AREA URNS HPF: ABNORMAL /HPF
NEUTROPHILS # BLD AUTO: 6.05 K/UL (ref 1.8–7.7)
NEUTROPHILS NFR BLD AUTO: 55.8 % (ref 53–65)
NEUTS SEG NFR BLD MANUAL: 56 % (ref 50–62)
NITRITE UR QL STRIP: NEGATIVE
NRBC # BLD AUTO: 0 X10E3/UL
NRBC, AUTO (.00): 0 %
OPIATES UR QL SCN: NEGATIVE
PCP UR QL SCN: NEGATIVE
PH UR STRIP: 5.5 PH UNITS
PLATELET # BLD AUTO: 313 K/UL (ref 150–400)
PLATELET MORPHOLOGY: NORMAL
POTASSIUM SERPL-SCNC: 3.7 MMOL/L (ref 3.5–5.1)
PROT SERPL-MCNC: 8.4 G/DL (ref 6.4–8.2)
PROT UR QL STRIP: 20
RBC # BLD AUTO: 4.93 M/UL (ref 4.6–6.2)
RBC # UR STRIP: ABNORMAL /UL
RBC #/AREA URNS HPF: ABNORMAL /HPF
RBC MORPH BLD: NORMAL
SODIUM SERPL-SCNC: 137 MMOL/L (ref 136–145)
SP GR UR STRIP: 1.02
SQUAMOUS #/AREA URNS LPF: ABNORMAL /LPF
TRICHOMONAS #/AREA URNS HPF: ABNORMAL /HPF
UROBILINOGEN UR STRIP-ACNC: NORMAL MG/DL
WBC # BLD AUTO: 10.83 K/UL (ref 4.5–11)
WBC #/AREA URNS HPF: ABNORMAL /HPF
YEAST #/AREA URNS HPF: ABNORMAL /HPF

## 2023-07-20 PROCEDURE — 85025 COMPLETE CBC W/AUTO DIFF WBC: CPT | Performed by: EMERGENCY MEDICINE

## 2023-07-20 PROCEDURE — 80053 COMPREHEN METABOLIC PANEL: CPT | Performed by: EMERGENCY MEDICINE

## 2023-07-20 PROCEDURE — 83735 ASSAY OF MAGNESIUM: CPT | Performed by: EMERGENCY MEDICINE

## 2023-07-20 PROCEDURE — 99284 EMERGENCY DEPT VISIT MOD MDM: CPT

## 2023-07-20 PROCEDURE — 81001 URINALYSIS AUTO W/SCOPE: CPT | Performed by: EMERGENCY MEDICINE

## 2023-07-20 PROCEDURE — 99284 PR EMERGENCY DEPT VISIT,LEVEL IV: ICD-10-PCS | Mod: ,,, | Performed by: EMERGENCY MEDICINE

## 2023-07-20 PROCEDURE — 99284 EMERGENCY DEPT VISIT MOD MDM: CPT | Mod: ,,, | Performed by: EMERGENCY MEDICINE

## 2023-07-20 PROCEDURE — 80307 DRUG TEST PRSMV CHEM ANLYZR: CPT | Performed by: EMERGENCY MEDICINE

## 2023-07-20 NOTE — ED PROVIDER NOTES
Encounter Date: 7/20/2023    SCRIBE #1 NOTE: I, Hayley Rachel, am scribing for, and in the presence of,  Brian Hodge MD. I have scribed the entire note.     History     Chief Complaint   Patient presents with    General Illness     Pt states he has had bilateral hip pain for 2 months - pt states he just moved back to [place] for [facility] - pt states he is trying to get back on medicaid - pt states he took tylenol yesterday morning     Patient is a 48 y.o. male who presents to the emergency department due to complaints of hip pain. Patient explains that since March 2023 he has been experiencing bilateral hip pain that has not improved. Patient states that the pain radiates down his hips and into his legs. He also reports lower back pain. Patient denies any recent fall or injury. No other symptoms were reported.    The history is provided by the patient. No  was used.   Review of patient's allergies indicates:   Allergen Reactions    Shrimp Swelling     Past Medical History:   Diagnosis Date    Hypertension      Past Surgical History:   Procedure Laterality Date    ANGIOGRAM, CORONARY, WITH LEFT HEART CATHETERIZATION Left 4/21/2021    Procedure: Left heart cath w/ angiograms;  Surgeon: Toby Mcwilliams MD;  Location: [facility] CATH LAB;  Service: Cardiology;  Laterality: Left;    HAND SURGERY      per Dr Castillo     Family History   Problem Relation Age of Onset    Heart disease Mother     Heart disease Brother      Social History     Tobacco Use    Smoking status: Every Day     Types: Cigarettes    Smokeless tobacco: Never    Tobacco comments:     quit 2 weeks ago when he became sob   Substance Use Topics    Alcohol use: Yes     Alcohol/week: 1.0 standard drink     Types: 1 Cans of beer per week     Comment: occasionally    Drug use: Yes     Types: Marijuana     Review of Systems   Eyes: Negative.    Endocrine: Negative.    Musculoskeletal:  Positive for back pain.        Hip pain.    Skin: Negative.    Allergic/Immunologic: Negative.    Hematological: Negative.    Psychiatric/Behavioral: Negative.     All other systems reviewed and are negative.    Physical Exam     Initial Vitals [07/20/23 0110]   BP Pulse Resp Temp SpO2   (!) 181/103 103 18 98.5 °F (36.9 °C) 97 %      MAP       --         Physical Exam    Nursing note and vitals reviewed.  Constitutional: He appears well-developed and well-nourished.   HENT:   Head: Normocephalic and atraumatic.   Cardiovascular:  Normal rate, regular rhythm and normal heart sounds.           Pulmonary/Chest: Breath sounds normal.   Abdominal: Abdomen is soft. Bowel sounds are normal.     Neurological: He is alert and oriented to person, place, and time.   Skin: Skin is warm and dry.   Psychiatric: He has a normal mood and affect. Thought content normal.       ED Course   Procedures  Labs Reviewed   URINALYSIS, REFLEX TO URINE CULTURE - Abnormal; Notable for the following components:       Result Value    Leukocytes, UA Moderate (*)     Protein, UA 20 (*)     Blood, UA Small (*)     All other components within normal limits   DRUG SCREEN, URINE (BEAKER) - Abnormal; Notable for the following components:    Amphetamine, Urine Positive (*)     Cannabinoid, Urine Positive (*)     All other components within normal limits   COMPREHENSIVE METABOLIC PANEL - Abnormal; Notable for the following components:    Total Protein 8.4 (*)     Globulin 4.9 (*)     AST 14 (*)     All other components within normal limits   CBC WITH DIFFERENTIAL - Abnormal; Notable for the following components:    RDW 14.8 (*)     Monocytes % 8.9 (*)     Monocytes, Absolute 0.96 (*)     All other components within normal limits   URINALYSIS, MICROSCOPIC - Abnormal; Notable for the following components:    WBC, UA 5-10 (*)     Squamous Epithelial Cells, UA Few (*)     Mucus, UA Few (*)     All other components within normal limits   MAGNESIUM - Normal   CBC W/ AUTO DIFFERENTIAL    Narrative:      The following orders were created for panel order CBC auto differential.  Procedure                               Abnormality         Status                     ---------                               -----------         ------                     CBC with Differential[[id number]]        Abnormal            Final result               Manual Differential[[id number]]                              Final result                 Please view results for these tests on the individual orders.   MANUAL DIFFERENTIAL          Imaging Results              X-Ray Lumbar Spine Ap And Lateral (In process)                      X-Ray Hip 2 or 3 views Right (with Pelvis when performed) (In process)  Result time 07/20/23 01:48:54   Procedure changed from X-Ray Hip 2 or 3 views Left (with Pelvis when performed)                 X-Rays:   Independently Interpreted Readings:   Other Readings:  Lumbar spine x-ray shows spondylolisthesis L5-S1 and S1-S2  Pelvis x-ray is negative for acute findings  Medications - No data to display  Medical Decision Making:   Initial Assessment:   A 48-year-old male patient came to the emergency department complaining of chronic low back pain.  There is no problem with bowel or bladder control.  Differential Diagnosis:   Lumbosacral sprain  Intervertebral disc prolapse  DJD of lumbar spine  Spondylolisthesis of the lumbar spine  Clinical Tests:   Lab Tests: Ordered and Reviewed       <> Summary of Lab: Labs Reviewed  URINALYSIS, REFLEX TO URINE CULTURE - Abnormal; Notable for the following components:      Result Value   Leukocytes, UA Moderate (*)    Protein, UA 20 (*)    Blood, UA Small (*)    All other components within normal limits  DRUG SCREEN, URINE (BEAKER) - Abnormal; Notable for the following components:   Amphetamine, Urine Positive (*)    Cannabinoid, Urine Positive (*)    All other components within normal limits  COMPREHENSIVE METABOLIC PANEL - Abnormal; Notable for the following components:   Total  Protein 8.4 (*)    Globulin 4.9 (*)    AST 14 (*)    All other components within normal limits  CBC WITH DIFFERENTIAL - Abnormal; Notable for the following components:   RDW 14.8 (*)    Monocytes % 8.9 (*)    Monocytes, Absolute 0.96 (*)    All other components within normal limits  URINALYSIS, MICROSCOPIC - Abnormal; Notable for the following components:   WBC, UA 5-10 (*)    Squamous Epithelial Cells, UA Few (*)    Mucus, UA Few (*)    All other components within normal limits  MAGNESIUM - Normal  CBC W/ AUTO DIFFERENTIAL      Radiological Study: Ordered and Reviewed  ED Management:  I feel the patient is having spondylolisthesis of his lumbar spine.  I will refer him to Dr. Joshua Leonardo (back specialist) for further evaluation and treatment.          Attending Attestation:           Physician Attestation for Scribe:  Physician Attestation Statement for Scribe #1: I, Brian Hodge MD, reviewed documentation, as scribed by Hayley Rachel in my presence, and it is both accurate and complete.                        Clinical Impression:   Final diagnoses:  [R52] Pain  [M43.17] Spondylolisthesis at L5-S1 level (Primary)  [F15.10] Methamphetamine abuse  [F12.10] Marijuana abuse               Brian Hodge MD  07/21/23 3443

## 2023-08-02 DIAGNOSIS — M54.16 LUMBAR RADICULOPATHY: Primary | ICD-10-CM

## 2024-03-25 ENCOUNTER — OFFICE VISIT (OUTPATIENT)
Dept: FAMILY MEDICINE | Facility: CLINIC | Age: 49
End: 2024-03-25
Payer: COMMERCIAL

## 2024-03-25 VITALS
HEART RATE: 100 BPM | DIASTOLIC BLOOD PRESSURE: 100 MMHG | WEIGHT: 269.19 LBS | OXYGEN SATURATION: 97 % | TEMPERATURE: 98 F | BODY MASS INDEX: 42.25 KG/M2 | HEIGHT: 67 IN | SYSTOLIC BLOOD PRESSURE: 160 MMHG

## 2024-03-25 DIAGNOSIS — I50.9 HEART FAILURE, UNSPECIFIED HF CHRONICITY, UNSPECIFIED HEART FAILURE TYPE: ICD-10-CM

## 2024-03-25 DIAGNOSIS — Z13.220 SCREENING FOR LIPID DISORDERS: ICD-10-CM

## 2024-03-25 DIAGNOSIS — F17.200 TOBACCO DEPENDENCE SYNDROME: ICD-10-CM

## 2024-03-25 DIAGNOSIS — Z12.11 SCREENING FOR MALIGNANT NEOPLASM OF COLON: ICD-10-CM

## 2024-03-25 DIAGNOSIS — Z11.59 ENCOUNTER FOR HEPATITIS C SCREENING TEST FOR LOW RISK PATIENT: ICD-10-CM

## 2024-03-25 DIAGNOSIS — I10 UNCONTROLLED HYPERTENSION: ICD-10-CM

## 2024-03-25 DIAGNOSIS — Z76.89 ENCOUNTER TO ESTABLISH CARE: Primary | ICD-10-CM

## 2024-03-25 DIAGNOSIS — M54.16 LUMBAR RADICULOPATHY: ICD-10-CM

## 2024-03-25 DIAGNOSIS — Z13.1 SCREENING FOR DIABETES MELLITUS: ICD-10-CM

## 2024-03-25 DIAGNOSIS — Z91.199 PATIENT NON ADHERENCE: ICD-10-CM

## 2024-03-25 DIAGNOSIS — E66.01 CLASS 3 SEVERE OBESITY WITH BODY MASS INDEX (BMI) OF 40.0 TO 44.9 IN ADULT, UNSPECIFIED OBESITY TYPE, UNSPECIFIED WHETHER SERIOUS COMORBIDITY PRESENT: ICD-10-CM

## 2024-03-25 PROBLEM — E66.813 CLASS 3 SEVERE OBESITY WITH BODY MASS INDEX (BMI) OF 40.0 TO 44.9 IN ADULT: Status: ACTIVE | Noted: 2024-03-25

## 2024-03-25 PROBLEM — Z86.79 HISTORY OF HEART FAILURE: Status: ACTIVE | Noted: 2024-03-25

## 2024-03-25 LAB
ANION GAP SERPL CALCULATED.3IONS-SCNC: 8 MMOL/L (ref 7–16)
BUN SERPL-MCNC: 12 MG/DL (ref 7–18)
BUN/CREAT SERPL: 15 (ref 6–20)
CALCIUM SERPL-MCNC: 9.4 MG/DL (ref 8.5–10.1)
CHLORIDE SERPL-SCNC: 106 MMOL/L (ref 98–107)
CHOLEST SERPL-MCNC: 199 MG/DL (ref 0–200)
CHOLEST/HDLC SERPL: 4.3 {RATIO}
CO2 SERPL-SCNC: 31 MMOL/L (ref 21–32)
CREAT SERPL-MCNC: 0.81 MG/DL (ref 0.7–1.3)
EGFR (NO RACE VARIABLE) (RUSH/TITUS): 109 ML/MIN/1.73M2
EST. AVERAGE GLUCOSE BLD GHB EST-MCNC: 108 MG/DL
GLUCOSE SERPL-MCNC: 80 MG/DL (ref 74–106)
HBA1C MFR BLD HPLC: 5.4 % (ref 4.5–6.6)
HCV AB SER QL: NORMAL
HDLC SERPL-MCNC: 46 MG/DL (ref 40–60)
LDLC SERPL CALC-MCNC: 130 MG/DL
LDLC/HDLC SERPL: 2.8 {RATIO}
NONHDLC SERPL-MCNC: 153 MG/DL
NT-PROBNP SERPL-MCNC: 140 PG/ML (ref 1–125)
POTASSIUM SERPL-SCNC: 3.9 MMOL/L (ref 3.5–5.1)
SODIUM SERPL-SCNC: 141 MMOL/L (ref 136–145)
T4 FREE SERPL-MCNC: 0.98 NG/DL (ref 0.76–1.46)
TRIGL SERPL-MCNC: 113 MG/DL (ref 35–150)
TSH SERPL DL<=0.005 MIU/L-ACNC: 1.07 UIU/ML (ref 0.36–3.74)
VLDLC SERPL-MCNC: 23 MG/DL

## 2024-03-25 PROCEDURE — 4010F ACE/ARB THERAPY RXD/TAKEN: CPT | Mod: CPTII,,, | Performed by: STUDENT IN AN ORGANIZED HEALTH CARE EDUCATION/TRAINING PROGRAM

## 2024-03-25 PROCEDURE — 84443 ASSAY THYROID STIM HORMONE: CPT | Mod: ,,, | Performed by: CLINICAL MEDICAL LABORATORY

## 2024-03-25 PROCEDURE — 99406 BEHAV CHNG SMOKING 3-10 MIN: CPT | Mod: ,,, | Performed by: STUDENT IN AN ORGANIZED HEALTH CARE EDUCATION/TRAINING PROGRAM

## 2024-03-25 PROCEDURE — 1159F MED LIST DOCD IN RCRD: CPT | Mod: CPTII,,, | Performed by: STUDENT IN AN ORGANIZED HEALTH CARE EDUCATION/TRAINING PROGRAM

## 2024-03-25 PROCEDURE — 99214 OFFICE O/P EST MOD 30 MIN: CPT | Mod: 25,,, | Performed by: STUDENT IN AN ORGANIZED HEALTH CARE EDUCATION/TRAINING PROGRAM

## 2024-03-25 PROCEDURE — 3077F SYST BP >= 140 MM HG: CPT | Mod: CPTII,,, | Performed by: STUDENT IN AN ORGANIZED HEALTH CARE EDUCATION/TRAINING PROGRAM

## 2024-03-25 PROCEDURE — 3080F DIAST BP >= 90 MM HG: CPT | Mod: CPTII,,, | Performed by: STUDENT IN AN ORGANIZED HEALTH CARE EDUCATION/TRAINING PROGRAM

## 2024-03-25 PROCEDURE — 86803 HEPATITIS C AB TEST: CPT | Mod: ,,, | Performed by: CLINICAL MEDICAL LABORATORY

## 2024-03-25 PROCEDURE — 80048 BASIC METABOLIC PNL TOTAL CA: CPT | Mod: ,,, | Performed by: CLINICAL MEDICAL LABORATORY

## 2024-03-25 PROCEDURE — 83036 HEMOGLOBIN GLYCOSYLATED A1C: CPT | Mod: ,,, | Performed by: CLINICAL MEDICAL LABORATORY

## 2024-03-25 PROCEDURE — 3008F BODY MASS INDEX DOCD: CPT | Mod: CPTII,,, | Performed by: STUDENT IN AN ORGANIZED HEALTH CARE EDUCATION/TRAINING PROGRAM

## 2024-03-25 PROCEDURE — 84439 ASSAY OF FREE THYROXINE: CPT | Mod: ,,, | Performed by: CLINICAL MEDICAL LABORATORY

## 2024-03-25 PROCEDURE — 83880 ASSAY OF NATRIURETIC PEPTIDE: CPT | Mod: ,,, | Performed by: CLINICAL MEDICAL LABORATORY

## 2024-03-25 PROCEDURE — 3044F HG A1C LEVEL LT 7.0%: CPT | Mod: CPTII,,, | Performed by: STUDENT IN AN ORGANIZED HEALTH CARE EDUCATION/TRAINING PROGRAM

## 2024-03-25 PROCEDURE — 80061 LIPID PANEL: CPT | Mod: GZ,,, | Performed by: CLINICAL MEDICAL LABORATORY

## 2024-03-25 RX ORDER — SPIRONOLACTONE 25 MG/1
25 TABLET ORAL DAILY
Qty: 30 TABLET | Refills: 2 | Status: CANCELLED | OUTPATIENT
Start: 2024-03-25 | End: 2025-03-25

## 2024-03-25 RX ORDER — CLONIDINE HYDROCHLORIDE 0.1 MG/1
0.1 TABLET ORAL
Status: COMPLETED | OUTPATIENT
Start: 2024-03-25 | End: 2024-03-25

## 2024-03-25 RX ORDER — GABAPENTIN 100 MG/1
100 CAPSULE ORAL 3 TIMES DAILY
Qty: 90 CAPSULE | Refills: 0 | Status: SHIPPED | OUTPATIENT
Start: 2024-03-25 | End: 2024-04-08 | Stop reason: SDUPTHER

## 2024-03-25 RX ORDER — POTASSIUM CHLORIDE 750 MG/1
10 TABLET, EXTENDED RELEASE ORAL DAILY
Qty: 30 TABLET | Refills: 2 | Status: CANCELLED | OUTPATIENT
Start: 2024-03-25

## 2024-03-25 RX ORDER — CARVEDILOL 12.5 MG/1
12.5 TABLET ORAL 2 TIMES DAILY
Qty: 60 TABLET | Refills: 2 | Status: CANCELLED | OUTPATIENT
Start: 2024-03-25 | End: 2025-03-25

## 2024-03-25 RX ORDER — CARVEDILOL 12.5 MG/1
12.5 TABLET ORAL 2 TIMES DAILY
Qty: 60 TABLET | Refills: 0 | Status: SHIPPED | OUTPATIENT
Start: 2024-03-25 | End: 2024-04-08

## 2024-03-25 RX ORDER — LISINOPRIL 20 MG/1
20 TABLET ORAL DAILY
Qty: 30 TABLET | Refills: 2 | Status: CANCELLED | OUTPATIENT
Start: 2024-03-25 | End: 2025-03-25

## 2024-03-25 RX ORDER — LISINOPRIL 20 MG/1
20 TABLET ORAL DAILY
Qty: 30 TABLET | Refills: 0 | Status: SHIPPED | OUTPATIENT
Start: 2024-03-25 | End: 2024-04-08 | Stop reason: SDUPTHER

## 2024-03-25 RX ORDER — AMLODIPINE BESYLATE 5 MG/1
5 TABLET ORAL DAILY
Qty: 30 TABLET | Refills: 2 | Status: CANCELLED | OUTPATIENT
Start: 2024-03-25 | End: 2025-03-25

## 2024-03-25 RX ORDER — FUROSEMIDE 40 MG/1
40 TABLET ORAL DAILY
Qty: 30 TABLET | Refills: 2 | Status: CANCELLED | OUTPATIENT
Start: 2024-03-25 | End: 2025-03-25

## 2024-03-25 RX ORDER — ASPIRIN 325 MG
325 TABLET ORAL DAILY
Qty: 30 TABLET | Refills: 2 | Status: SHIPPED | OUTPATIENT
Start: 2024-03-25 | End: 2024-04-22

## 2024-03-25 RX ORDER — AMLODIPINE BESYLATE 5 MG/1
5 TABLET ORAL DAILY
Qty: 30 TABLET | Refills: 0 | Status: SHIPPED | OUTPATIENT
Start: 2024-03-25 | End: 2024-04-08 | Stop reason: SDUPTHER

## 2024-03-25 RX ORDER — ASPIRIN 325 MG
325 TABLET ORAL DAILY
Qty: 30 TABLET | Refills: 2 | Status: CANCELLED | OUTPATIENT
Start: 2024-03-25 | End: 2025-03-25

## 2024-03-25 RX ADMIN — CLONIDINE HYDROCHLORIDE 0.1 MG: 0.1 TABLET ORAL at 09:03

## 2024-03-25 NOTE — PROGRESS NOTES
[facility]    Chief Complaint      Chief Complaint   Patient presents with    Establish Care     Pt states need blood pressure medication. Pt haven't been on medication since February.       History of Present Illness      Ivania Whatley is a 48 y.o. male with chronic conditions of cardiomegaly, new onset chf, uncontrolled htn, lakeisha who presents today to establish care, also asking for htn med refills, states has been out of his medications x's since February.  Blood pressure elevated today, clonidine given.   Pt moved back to [place] from [facility] in February of 2023.  Has not seen a provider since that time. Is here today with severely elevated high blood pressure, has been treated with clonidine x's 1.  Pt states was following with cardiology in [facility] but has not seen cards since moving. Was using [facility] in [place], [place], will obtain medication list to confirm medications.  1-2 cigarettes daily.     Past Medical History:  Past Medical History:   Diagnosis Date    Hypertension     Sleep apnea, unspecified        Past Surgical History:   has a past surgical history that includes Hand surgery and ANGIOGRAM, CORONARY, WITH LEFT HEART CATHETERIZATION (Left, 4/21/2021).    Social History:  Social History     Tobacco Use    Smoking status: Every Day     Types: Cigarettes    Smokeless tobacco: Never    Tobacco comments:     quit 2 weeks ago when he became sob   Substance Use Topics    Alcohol use: Not Currently     Alcohol/week: 1.0 standard drink of alcohol     Types: 1 Cans of beer per week     Comment: occasionally    Drug use: Yes     Types: Marijuana       I personally reviewed all past medical, surgical, and social.     Review of Systems   Constitutional:  Negative for fatigue and fever.   HENT:  Negative for sore throat.    Eyes:  Negative for visual disturbance.   Respiratory:  Negative for shortness of breath and wheezing.    Cardiovascular:  Negative for chest pain and leg swelling.    Gastrointestinal:  Negative for abdominal pain.   Genitourinary:  Negative for dysuria.   Musculoskeletal:  Positive for arthralgias and back pain.   Integumentary:  Negative for rash.   Neurological:  Negative for dizziness, light-headedness, numbness and headaches.   All other systems reviewed and are negative.       Medications:  Outpatient Encounter Medications as of 3/25/2024   Medication Sig Dispense Refill    amLODIPine (NORVASC) 5 MG tablet Take 1 tablet (5 mg total) by mouth once daily. 30 tablet 0    aspirin 325 MG tablet Take 1 tablet (325 mg total) by mouth once daily. 30 tablet 2    carvediloL (COREG) 12.5 MG tablet Take 1 tablet (12.5 mg total) by mouth 2 (two) times daily. 60 tablet 0    furosemide (LASIX) 40 MG tablet Take 1 tablet (40 mg total) by mouth once daily. 30 tablet 2    gabapentin (NEURONTIN) 100 MG capsule Take 1 capsule (100 mg total) by mouth 3 (three) times daily. 90 capsule 0    lisinopriL (PRINIVIL,ZESTRIL) 20 MG tablet Take 1 tablet (20 mg total) by mouth once daily. 30 tablet 0    potassium chloride (KLOR-CON) 10 MEQ TbSR Take 1 tablet (10 mEq total) by mouth once daily. 30 tablet 2    spironolactone (ALDACTONE) 25 MG tablet Take 1 tablet (25 mg total) by mouth once daily. 30 tablet 2    [DISCONTINUED] amLODIPine (NORVASC) 5 MG tablet Take 1 tablet (5 mg total) by mouth once daily. 30 tablet 2    [DISCONTINUED] aspirin 325 MG tablet Take 1 tablet (325 mg total) by mouth once daily. 30 tablet 2    [DISCONTINUED] carvediloL (COREG) 12.5 MG tablet Take 1 tablet (12.5 mg total) by mouth 2 (two) times daily. 60 tablet 2    [DISCONTINUED] lisinopriL (PRINIVIL,ZESTRIL) 20 MG tablet Take 1 tablet (20 mg total) by mouth once daily. 30 tablet 2    [] cloNIDine tablet 0.1 mg        No facility-administered encounter medications on file as of 3/25/2024.       Allergies:  Review of patient's allergies indicates:   Allergen Reactions    Shrimp Swelling       Health Maintenance:    There  "is no immunization history on file for this patient.   Health Maintenance   Topic Date Due    TETANUS VACCINE  Never done    Colorectal Cancer Screening  Never done    Lipid Panel  03/25/2029    Hepatitis C Screening  Completed        Physical Exam      Vital Signs  Temp: 98.3 °F (36.8 °C)  Temp Source: Oral  Pulse: 100  SpO2: 97 %  BP: (!) 160/100  BP Location: Left arm  Patient Position: Sitting  Height and Weight  Height: 5' 7" (170.2 cm)  Weight: 122.1 kg (269 lb 3 oz)  BSA (Calculated - sq m): 2.4 sq meters  BMI (Calculated): 42.2  Weight in (lb) to have BMI = 25: 159.3]    Physical Exam  Vitals and nursing note reviewed.   Constitutional:       Appearance: Normal appearance.   HENT:      Head: Normocephalic and atraumatic.      Nose: Nose normal.   Eyes:      Conjunctiva/sclera: Conjunctivae normal.   Cardiovascular:      Rate and Rhythm: Normal rate and regular rhythm.      Heart sounds: Normal heart sounds.   Pulmonary:      Effort: Pulmonary effort is normal.      Breath sounds: Normal breath sounds.   Musculoskeletal:         General: Tenderness present.      Cervical back: Normal range of motion.      Right lower leg: No edema.      Left lower leg: No edema.   Skin:     General: Skin is warm and dry.      Findings: No rash.   Neurological:      General: No focal deficit present.      Mental Status: He is alert and oriented to person, place, and time. Mental status is at baseline.      Gait: Gait normal.   Psychiatric:         Mood and Affect: Mood normal.         Behavior: Behavior normal.         Thought Content: Thought content normal.         Judgment: Judgment normal.          Laboratory:  CBC:  Recent Labs   Lab 04/21/21  0244 04/22/21  0303 07/20/23  0133   WBC 13.47 H 17.84 H 10.83   RBC 4.32 L 4.71 4.93   Hemoglobin 12.4 L 13.3 L 13.9   Hematocrit 38.4 L 40.7 41.9   Platelet Count 330 374 313   MCV 88.9 86.4 85.0   MCH 28.7 28.2 28.2   MCHC 32.3 32.7 33.2     CMP:  Recent Labs   Lab 04/20/21  1034 " 04/21/21 0244 07/20/23  0133 03/25/24  0944   Glucose 99   < > 96 80   Calcium 8.6   < > 9.3 9.4   Albumin 2.9 L  --  3.5  --    Total Protein 7.1  --  8.4 H  --    Sodium 143   < > 137 141   Potassium 4.2   < > 3.7 3.9   CO2 28   < > 32 31   Chloride 106   < > 101 106   BUN 11   < > 11 12   Alk Phos 82  --  89  --    ALT 51  --  18  --    AST 31  --  14 L  --    Bilirubin, Total 0.5  --  0.5  --     < > = values in this interval not displayed.     LIPIDS:  Recent Labs   Lab 04/21/21 0244 04/22/21  1130 03/25/24  0944   TSH  --  0.420 1.070   HDL Cholesterol 36 L  --  46   Cholesterol 123  --  199   Triglycerides 84  --  113   LDL Calculated 70  --  130   Cholesterol/HDL Ratio (Risk Factor) 3.4  --  4.3   Non-HDL 87  --  153     TSH:  Recent Labs   Lab 04/22/21  1130 03/25/24  0944   TSH 0.420 1.070     A1C:  Recent Labs   Lab 04/21/21 0244 03/25/24  0944   Hemoglobin A1C 5.5 5.4       Assessment/Plan     Ivania Whatley is a 48 y.o.male with:    1. Encounter to establish care    2. Uncontrolled hypertension  -     cloNIDine tablet 0.1 mg  -     T4, Free; Future  -     TSH; Future  -     Basic Metabolic Panel; Future  -     amLODIPine (NORVASC) 5 MG tablet; Take 1 tablet (5 mg total) by mouth once daily.  Dispense: 30 tablet; Refill: 0  -     aspirin 325 MG tablet; Take 1 tablet (325 mg total) by mouth once daily.  Dispense: 30 tablet; Refill: 2  -     carvediloL (COREG) 12.5 MG tablet; Take 1 tablet (12.5 mg total) by mouth 2 (two) times daily.  Dispense: 60 tablet; Refill: 0  -     lisinopriL (PRINIVIL,ZESTRIL) 20 MG tablet; Take 1 tablet (20 mg total) by mouth once daily.  Dispense: 30 tablet; Refill: 0  -     Ambulatory referral/consult to Cardiology; Future; Expected date: 04/01/2024    3. Class 3 severe obesity with body mass index (BMI) of 40.0 to 44.9 in adult, unspecified obesity type, unspecified whether serious comorbidity present  -     diet and exercise     4. Heart failure, unspecified HF chronicity,  unspecified heart failure type  -     amLODIPine (NORVASC) 5 MG tablet; Take 1 tablet (5 mg total) by mouth once daily.  Dispense: 30 tablet; Refill: 0  -     aspirin 325 MG tablet; Take 1 tablet (325 mg total) by mouth once daily.  Dispense: 30 tablet; Refill: 2  -     carvediloL (COREG) 12.5 MG tablet; Take 1 tablet (12.5 mg total) by mouth 2 (two) times daily.  Dispense: 60 tablet; Refill: 0  -     lisinopriL (PRINIVIL,ZESTRIL) 20 MG tablet; Take 1 tablet (20 mg total) by mouth once daily.  Dispense: 30 tablet; Refill: 0  -     Ambulatory referral/consult to Cardiology; Future; Expected date: 04/01/2024  -     NT-Pro Natriuretic Peptide; Future; Expected date: 03/25/2024    5. Lumbar radiculopathy  -     gabapentin (NEURONTIN) 100 MG capsule; Take 1 capsule (100 mg total) by mouth 3 (three) times daily.  Dispense: 90 capsule; Refill: 0  -     Ambulatory referral/consult to [facility]; Future; Expected date: 04/01/2024  -     Ambulatory referral/consult to Physical/Occupational Therapy; Future; Expected date: 04/01/2024  -     Ambulatory referral/consult to Pain Clinic; Future; Expected date: 04/01/2024    6. Encounter for hepatitis C screening test for low risk patient  -     Hepatitis C Antibody; Future; Expected date: 03/25/2024    7. Screening for diabetes mellitus  -     Hemoglobin A1C; Future; Expected date: 03/25/2024    8. Screening for lipid disorders  -     Lipid Panel; Future    9. Screening for malignant neoplasm of colon  -     Cologuard Screening (Multitarget Stool DNA); Future; Expected date: 03/25/2024    10. Patient non adherence    11. Tobacco dependence syndrome  -     [82785] [place] TOBACCO USE CESSATION INTERMEDIATE 3-10 MIN  -       Tobacco Use/Cessation:  I assessed Ivania Whatley and discussed smoking cessation with him for 3-10 minutes. He reports that he has been smoking cigarettes. He has never used smokeless tobacco.           Chronic conditions status updated as per HPI.  Other  than changes above, cont current medications and maintain follow up with specialists.  Return to clinic in 2 weeks.    Patient Instructions   Lose weight (if you are overweight)  Choose a diet low in fat and rich in fruits, vegetables, and low-fat dairy products  Reduce the amount of salt you eat  Do something active for at least 30 minutes a day on most days of the week  Cut down on alcohol (if you drink more than 2 alcoholic drinks per day)       Dionne Stein, FNP-BC  [facility]

## 2024-03-25 NOTE — Clinical Note
Declines all vaccines Need medication list from [facility] in [place], [place] Need cardiology records from [facility] in [place]

## 2024-03-25 NOTE — PATIENT INSTRUCTIONS
Lose weight (if you are overweight)  Choose a diet low in fat and rich in fruits, vegetables, and low-fat dairy products  Reduce the amount of salt you eat  Do something active for at least 30 minutes a day on most days of the week  Cut down on alcohol (if you drink more than 2 alcoholic drinks per day)

## 2024-03-26 DIAGNOSIS — I10 UNCONTROLLED HYPERTENSION: ICD-10-CM

## 2024-03-26 DIAGNOSIS — I50.9 HEART FAILURE, UNSPECIFIED HF CHRONICITY, UNSPECIFIED HEART FAILURE TYPE: ICD-10-CM

## 2024-03-26 RX ORDER — CARVEDILOL 12.5 MG/1
12.5 TABLET ORAL 2 TIMES DAILY
Qty: 180 TABLET | OUTPATIENT
Start: 2024-03-26

## 2024-03-26 RX ORDER — LISINOPRIL 20 MG/1
TABLET ORAL
Qty: 90 TABLET | OUTPATIENT
Start: 2024-03-26

## 2024-03-26 RX ORDER — AMLODIPINE BESYLATE 5 MG/1
5 TABLET ORAL
Qty: 90 TABLET | OUTPATIENT
Start: 2024-03-26

## 2024-03-27 ENCOUNTER — TELEPHONE (OUTPATIENT)
Dept: FAMILY MEDICINE | Facility: CLINIC | Age: 49
End: 2024-03-27
Payer: COMMERCIAL

## 2024-03-27 NOTE — TELEPHONE ENCOUNTER
----- Message from MELODY Mir sent at 3/26/2024  9:11 AM CDT -----  Please contact the patient and let them know that their results were fine and do not require any change in treatment.

## 2024-04-02 ENCOUNTER — PATIENT OUTREACH (OUTPATIENT)
Dept: ADMINISTRATIVE | Facility: HOSPITAL | Age: 49
End: 2024-04-02

## 2024-04-02 NOTE — LETTER
AUTHORIZATION FOR RELEASE OF   CONFIDENTIAL INFORMATION    Dear FARIHA,    We are seeing Ivania Whatley, date of birth 1975, in the clinic at [facility]. Dionne Stein FNP is the patient's PCP. Ivania hWatley has an outstanding lab/procedure at the time we reviewed his chart. In order to help keep his health information updated, he has authorized us to request the following medical record(s):        (  )  MAMMOGRAM                                      (  )  COLONOSCOPY      (  )  PAP SMEAR                                          ( x )  OUTSIDE LAB RESULTS     (  )  DEXA SCAN                                          (  )  EYE EXAM            (  )  FOOT EXAM                                          (  )  ENTIRE RECORD     (  )  OUTSIDE IMMUNIZATIONS        X-recent clinic notes labs and tests etc         Please fax records to [facility], McFadden, Veronica, FNP, [phone]     If you have any questions, please contact Lorenzo Orozco at [phone].           Patient Name: Ivania Whatley  : 1975  Patient Phone #: [phone]

## 2024-04-02 NOTE — LETTER
AUTHORIZATION FOR RELEASE OF   CONFIDENTIAL INFORMATION    Dear [facility],    We are seeing Ivania Whatley, date of birth 1975, in the clinic at [facility]. Dionne Stein FNP is the patient's PCP. Ivania Whatley has an outstanding lab/procedure at the time we reviewed his chart. In order to help keep his health information updated, he has authorized us to request the following medical record(s):        (  )  MAMMOGRAM                                      (  )  COLONOSCOPY      (  )  PAP SMEAR                                          (  )  OUTSIDE LAB RESULTS     (  )  DEXA SCAN                                          (  )  EYE EXAM            (  )  FOOT EXAM                                          (  )  ENTIRE RECORD     ( x )  OUTSIDE IMMUNIZATIONS                 (  )  _______________         Please fax records to [facility], McFadden, Veronica, FNP, [phone]     If you have any questions, please contact Lorenzo Orozco at [phone].           Patient Name: Ivania Whatley  : 1975  Patient Phone #: [phone]

## 2024-04-08 ENCOUNTER — OFFICE VISIT (OUTPATIENT)
Dept: FAMILY MEDICINE | Facility: CLINIC | Age: 49
End: 2024-04-08
Payer: COMMERCIAL

## 2024-04-08 VITALS
BODY MASS INDEX: 40.77 KG/M2 | DIASTOLIC BLOOD PRESSURE: 88 MMHG | WEIGHT: 269 LBS | HEART RATE: 86 BPM | RESPIRATION RATE: 16 BRPM | OXYGEN SATURATION: 96 % | SYSTOLIC BLOOD PRESSURE: 146 MMHG | HEIGHT: 68 IN | TEMPERATURE: 98 F

## 2024-04-08 DIAGNOSIS — M54.16 LUMBAR RADICULOPATHY: ICD-10-CM

## 2024-04-08 DIAGNOSIS — I10 UNCONTROLLED HYPERTENSION: ICD-10-CM

## 2024-04-08 DIAGNOSIS — I10 UNCONTROLLED HYPERTENSION: Primary | ICD-10-CM

## 2024-04-08 DIAGNOSIS — I50.9 HEART FAILURE, UNSPECIFIED HF CHRONICITY, UNSPECIFIED HEART FAILURE TYPE: ICD-10-CM

## 2024-04-08 DIAGNOSIS — I51.7 CARDIOMEGALY: ICD-10-CM

## 2024-04-08 PROCEDURE — 3077F SYST BP >= 140 MM HG: CPT | Mod: CPTII,,, | Performed by: STUDENT IN AN ORGANIZED HEALTH CARE EDUCATION/TRAINING PROGRAM

## 2024-04-08 PROCEDURE — 3008F BODY MASS INDEX DOCD: CPT | Mod: CPTII,,, | Performed by: STUDENT IN AN ORGANIZED HEALTH CARE EDUCATION/TRAINING PROGRAM

## 2024-04-08 PROCEDURE — 99214 OFFICE O/P EST MOD 30 MIN: CPT | Mod: 25,,, | Performed by: STUDENT IN AN ORGANIZED HEALTH CARE EDUCATION/TRAINING PROGRAM

## 2024-04-08 PROCEDURE — 1159F MED LIST DOCD IN RCRD: CPT | Mod: CPTII,,, | Performed by: STUDENT IN AN ORGANIZED HEALTH CARE EDUCATION/TRAINING PROGRAM

## 2024-04-08 PROCEDURE — 3044F HG A1C LEVEL LT 7.0%: CPT | Mod: CPTII,,, | Performed by: STUDENT IN AN ORGANIZED HEALTH CARE EDUCATION/TRAINING PROGRAM

## 2024-04-08 PROCEDURE — 4010F ACE/ARB THERAPY RXD/TAKEN: CPT | Mod: CPTII,,, | Performed by: STUDENT IN AN ORGANIZED HEALTH CARE EDUCATION/TRAINING PROGRAM

## 2024-04-08 PROCEDURE — 96372 THER/PROPH/DIAG INJ SC/IM: CPT | Mod: ,,, | Performed by: STUDENT IN AN ORGANIZED HEALTH CARE EDUCATION/TRAINING PROGRAM

## 2024-04-08 PROCEDURE — 3079F DIAST BP 80-89 MM HG: CPT | Mod: CPTII,,, | Performed by: STUDENT IN AN ORGANIZED HEALTH CARE EDUCATION/TRAINING PROGRAM

## 2024-04-08 RX ORDER — KETOROLAC TROMETHAMINE 30 MG/ML
60 INJECTION, SOLUTION INTRAMUSCULAR; INTRAVENOUS
Status: COMPLETED | OUTPATIENT
Start: 2024-04-08 | End: 2024-04-08

## 2024-04-08 RX ORDER — FUROSEMIDE 20 MG/1
20 TABLET ORAL DAILY PRN
Qty: 30 TABLET | Refills: 2 | Status: SHIPPED | OUTPATIENT
Start: 2024-04-08 | End: 2024-04-22 | Stop reason: SDUPTHER

## 2024-04-08 RX ORDER — GABAPENTIN 300 MG/1
300 CAPSULE ORAL 2 TIMES DAILY
Qty: 180 CAPSULE | Refills: 0 | Status: SHIPPED | OUTPATIENT
Start: 2024-04-08 | End: 2024-05-15 | Stop reason: SDUPTHER

## 2024-04-08 RX ORDER — AMLODIPINE BESYLATE 10 MG/1
10 TABLET ORAL
Qty: 90 TABLET | OUTPATIENT
Start: 2024-04-08

## 2024-04-08 RX ORDER — LISINOPRIL 20 MG/1
20 TABLET ORAL DAILY
Qty: 90 TABLET | Refills: 0 | Status: SHIPPED | OUTPATIENT
Start: 2024-04-08 | End: 2024-04-22 | Stop reason: SDUPTHER

## 2024-04-08 RX ORDER — DEXAMETHASONE SODIUM PHOSPHATE 4 MG/ML
4 INJECTION, SOLUTION INTRA-ARTICULAR; INTRALESIONAL; INTRAMUSCULAR; INTRAVENOUS; SOFT TISSUE
Status: COMPLETED | OUTPATIENT
Start: 2024-04-08 | End: 2024-04-08

## 2024-04-08 RX ORDER — IBUPROFEN 800 MG/1
800 TABLET ORAL 3 TIMES DAILY
Qty: 30 TABLET | Refills: 0 | Status: SHIPPED | OUTPATIENT
Start: 2024-04-08 | End: 2024-04-12

## 2024-04-08 RX ORDER — METHYLPREDNISOLONE ACETATE 40 MG/ML
40 INJECTION, SUSPENSION INTRA-ARTICULAR; INTRALESIONAL; INTRAMUSCULAR; SOFT TISSUE ONCE
Status: COMPLETED | OUTPATIENT
Start: 2024-04-08 | End: 2024-04-08

## 2024-04-08 RX ORDER — CARVEDILOL 25 MG/1
25 TABLET ORAL 2 TIMES DAILY
Qty: 180 TABLET | Refills: 0 | Status: SHIPPED | OUTPATIENT
Start: 2024-04-08 | End: 2024-05-15 | Stop reason: SDUPTHER

## 2024-04-08 RX ORDER — AMLODIPINE BESYLATE 10 MG/1
10 TABLET ORAL DAILY
Qty: 30 TABLET | Refills: 0 | Status: SHIPPED | OUTPATIENT
Start: 2024-04-08 | End: 2024-04-08 | Stop reason: SINTOL

## 2024-04-08 RX ORDER — SPIRONOLACTONE 25 MG/1
25 TABLET ORAL DAILY
Qty: 90 TABLET | Refills: 0 | Status: SHIPPED | OUTPATIENT
Start: 2024-04-08 | End: 2024-05-15 | Stop reason: SDUPTHER

## 2024-04-08 RX ADMIN — METHYLPREDNISOLONE ACETATE 40 MG: 40 INJECTION, SUSPENSION INTRA-ARTICULAR; INTRALESIONAL; INTRAMUSCULAR; SOFT TISSUE at 01:04

## 2024-04-08 RX ADMIN — DEXAMETHASONE SODIUM PHOSPHATE 4 MG: 4 INJECTION, SOLUTION INTRA-ARTICULAR; INTRALESIONAL; INTRAMUSCULAR; INTRAVENOUS; SOFT TISSUE at 01:04

## 2024-04-08 RX ADMIN — KETOROLAC TROMETHAMINE 60 MG: 30 INJECTION, SOLUTION INTRAMUSCULAR; INTRAVENOUS at 01:04

## 2024-04-08 NOTE — PATIENT INSTRUCTIONS
-   follow dash diet        -    increase physical activity        -     monitor blood pressure at home, readings should be < 130/80        -    follow up in one month

## 2024-04-08 NOTE — PROGRESS NOTES
[facility]    Chief Complaint      Chief Complaint   Patient presents with    Follow-up     1 month follow up for htn       History of Present Illness      Ivania Whatley is a 48 y.o. male with chronic conditions of non ad herence, radiculopathy, cardiomegaly, HF, obesity, Alo, htn who presents today for htn follow up. Still with muscle and joint pains. Not on any statin or Nsaids, states has noticed BLE edema since re-starting amlodipine. Declines labs to check his muscle enzymes, but is agreeable to imaging. Cxr ordered. Pt has pending referrals to pain management and spine.  Will increase gabapentin and add Nsaids. Blood pressure still slightly elevated, will adjust medications today. Pt also with pending referral to cardiology.         Past Medical History:  Past Medical History:   Diagnosis Date    Hypertension     Sleep apnea, unspecified        Past Surgical History:   has a past surgical history that includes Hand surgery and ANGIOGRAM, CORONARY, WITH LEFT HEART CATHETERIZATION (Left, 4/21/2021).    Social History:  Social History     Tobacco Use    Smoking status: Every Day     Types: Cigarettes    Smokeless tobacco: Never    Tobacco comments:     quit 2 weeks ago when he became sob   Substance Use Topics    Alcohol use: Not Currently     Alcohol/week: 1.0 standard drink of alcohol     Types: 1 Cans of beer per week     Comment: occasionally    Drug use: Yes     Types: Marijuana       I personally reviewed all past medical, surgical, and social.     Review of Systems   Constitutional:  Negative for fatigue and fever.   HENT:  Negative for sore throat.    Respiratory:  Negative for shortness of breath.    Cardiovascular:  Positive for leg swelling. Negative for chest pain.   Gastrointestinal:  Negative for abdominal pain.   Genitourinary:  Negative for dysuria.   Musculoskeletal:  Positive for arthralgias and back pain.   Integumentary:  Negative for rash.   Neurological:  Negative for headaches.    All other systems reviewed and are negative.       Medications:  Outpatient Encounter Medications as of 4/8/2024   Medication Sig Note Dispense Refill    aspirin 325 MG tablet Take 1 tablet (325 mg total) by mouth once daily.  30 tablet 2    potassium chloride (KLOR-CON) 10 MEQ TbSR Take 1 tablet (10 mEq total) by mouth once daily.  30 tablet 2    [DISCONTINUED] amLODIPine (NORVASC) 5 MG tablet Take 1 tablet (5 mg total) by mouth once daily.  30 tablet 0    [DISCONTINUED] carvediloL (COREG) 12.5 MG tablet Take 1 tablet (12.5 mg total) by mouth 2 (two) times daily.  60 tablet 0    [DISCONTINUED] furosemide (LASIX) 40 MG tablet Take 1 tablet (40 mg total) by mouth once daily.  30 tablet 2    [DISCONTINUED] gabapentin (NEURONTIN) 100 MG capsule Take 1 capsule (100 mg total) by mouth 3 (three) times daily.  90 capsule 0    [DISCONTINUED] lisinopriL (PRINIVIL,ZESTRIL) 20 MG tablet Take 1 tablet (20 mg total) by mouth once daily.  30 tablet 0    [DISCONTINUED] spironolactone (ALDACTONE) 25 MG tablet Take 1 tablet (25 mg total) by mouth once daily.  30 tablet 2    carvediloL (COREG) 25 MG tablet Take 1 tablet (25 mg total) by mouth 2 (two) times daily.  180 tablet 0    furosemide (LASIX) 20 MG tablet Take 1 tablet (20 mg total) by mouth daily as needed (edema).  30 tablet 2    gabapentin (NEURONTIN) 300 MG capsule Take 1 capsule (300 mg total) by mouth 2 (two) times daily.  180 capsule 0    ibuprofen (ADVIL,MOTRIN) 800 MG tablet Take 1 tablet (800 mg total) by mouth 3 (three) times daily.  30 tablet 0    lisinopriL (PRINIVIL,ZESTRIL) 20 MG tablet Take 1 tablet (20 mg total) by mouth once daily.  90 tablet 0    spironolactone (ALDACTONE) 25 MG tablet Take 1 tablet (25 mg total) by mouth once daily.  90 tablet 0    [DISCONTINUED] amLODIPine (NORVASC) 10 MG tablet Take 1 tablet (10 mg total) by mouth once daily. 4/8/2024: pt states he thinks these is causing swelling 30 tablet 0     Facility-Administered Encounter  "Medications as of 4/8/2024   Medication Dose Route Frequency Provider Last Rate Last Admin    [COMPLETED] dexAMETHasone injection 4 mg  4 mg Intramuscular 1 time in Clinic/HOD Dionne Stein FNP   4 mg at 04/08/24 1345    [COMPLETED] ketorolac injection 60 mg  60 mg Intramuscular 1 time in Clinic/HOD Dionne Stein FNP   60 mg at 04/08/24 1348    [COMPLETED] methylPREDNISolone acetate injection 40 mg  40 mg Intramuscular Once Dionne Stein FNP   40 mg at 04/08/24 1347       Allergies:  Review of patient's allergies indicates:   Allergen Reactions    Shrimp Swelling       Health Maintenance:    There is no immunization history on file for this patient.   Health Maintenance   Topic Date Due    TETANUS VACCINE  Never done    Colorectal Cancer Screening  Never done    Lipid Panel  03/25/2029    Hepatitis C Screening  Completed        Physical Exam      Vital Signs  Temp: 98.2 °F (36.8 °C)  Temp Source: Oral  Pulse: 86  Resp: 16  SpO2: 96 %  BP: (!) 146/88  BP Location: Left arm  Patient Position: Sitting  Height and Weight  Height: 5' 8" (172.7 cm)  Weight: 122 kg (269 lb)  BSA (Calculated - sq m): 2.42 sq meters  BMI (Calculated): 40.9  Weight in (lb) to have BMI = 25: 164.1]    Physical Exam  Vitals and nursing note reviewed.   Constitutional:       Appearance: Normal appearance. He is obese.   HENT:      Head: Normocephalic and atraumatic.   Eyes:      Conjunctiva/sclera: Conjunctivae normal.   Cardiovascular:      Rate and Rhythm: Normal rate and regular rhythm.   Pulmonary:      Effort: Pulmonary effort is normal.      Breath sounds: Normal breath sounds.   Musculoskeletal:         General: No tenderness.      Right lower leg: Edema present.      Left lower leg: Edema present.   Skin:     General: Skin is warm and dry.   Neurological:      General: No focal deficit present.      Mental Status: He is alert and oriented to person, place, and time. Mental status is at baseline.      Gait: Gait normal. "   Psychiatric:         Mood and Affect: Mood normal.         Behavior: Behavior normal.         Thought Content: Thought content normal.         Judgment: Judgment normal.          Laboratory:  CBC:  Recent Labs   Lab 04/21/21  0244 04/22/21  0303 07/20/23  0133   WBC 13.47 H 17.84 H 10.83   RBC 4.32 L 4.71 4.93   Hemoglobin 12.4 L 13.3 L 13.9   Hematocrit 38.4 L 40.7 41.9   Platelet Count 330 374 313   MCV 88.9 86.4 85.0   MCH 28.7 28.2 28.2   MCHC 32.3 32.7 33.2     CMP:  Recent Labs   Lab 04/20/21  1034 04/21/21  0244 07/20/23  0133 03/25/24  0944   Glucose 99   < > 96 80   Calcium 8.6   < > 9.3 9.4   Albumin 2.9 L  --  3.5  --    Total Protein 7.1  --  8.4 H  --    Sodium 143   < > 137 141   Potassium 4.2   < > 3.7 3.9   CO2 28   < > 32 31   Chloride 106   < > 101 106   BUN 11   < > 11 12   Alk Phos 82  --  89  --    ALT 51  --  18  --    AST 31  --  14 L  --    Bilirubin, Total 0.5  --  0.5  --     < > = values in this interval not displayed.     LIPIDS:  Recent Labs   Lab 04/21/21  0244 04/22/21  1130 03/25/24  0944   TSH  --  0.420 1.070   HDL Cholesterol 36 L  --  46   Cholesterol 123  --  199   Triglycerides 84  --  113   LDL Calculated 70  --  130   Cholesterol/HDL Ratio (Risk Factor) 3.4  --  4.3   Non-HDL 87  --  153     TSH:  Recent Labs   Lab 04/22/21  1130 03/25/24  0944   TSH 0.420 1.070     A1C:  Recent Labs   Lab 04/21/21 0244 03/25/24  0944   Hemoglobin A1C 5.5 5.4       Assessment/Plan     Ivania Whatley is a 48 y.o.male with:    1. Uncontrolled hypertension  -     Discontinue: amLODIPine (NORVASC) 10 MG tablet; Take 1 tablet (10 mg total) by mouth once daily.  Dispense: 30 tablet; Refill: 0  -     spironolactone (ALDACTONE) 25 MG tablet; Take 1 tablet (25 mg total) by mouth once daily.  Dispense: 90 tablet; Refill: 0  -     carvediloL (COREG) 25 MG tablet; Take 1 tablet (25 mg total) by mouth 2 (two) times daily.  Dispense: 180 tablet; Refill: 0  -     furosemide (LASIX) 20 MG tablet; Take 1  tablet (20 mg total) by mouth daily as needed (edema).  Dispense: 30 tablet; Refill: 2  -     lisinopriL (PRINIVIL,ZESTRIL) 20 MG tablet; Take 1 tablet (20 mg total) by mouth once daily.  Dispense: 90 tablet; Refill: 0    2. Cardiomegaly  -     X-Ray Chest PA And Lateral; Future; Expected date: 04/08/2024    3. Lumbar radiculopathy  -     gabapentin (NEURONTIN) 300 MG capsule; Take 1 capsule (300 mg total) by mouth 2 (two) times daily.  Dispense: 180 capsule; Refill: 0  -     ibuprofen (ADVIL,MOTRIN) 800 MG tablet; Take 1 tablet (800 mg total) by mouth 3 (three) times daily.  Dispense: 30 tablet; Refill: 0  -     ketorolac injection 60 mg  -     dexAMETHasone injection 4 mg  -     methylPREDNISolone acetate injection 40 mg    4. Heart failure, unspecified HF chronicity, unspecified heart failure type  -     carvediloL (COREG) 25 MG tablet; Take 1 tablet (25 mg total) by mouth 2 (two) times daily.  Dispense: 180 tablet; Refill: 0  -     furosemide (LASIX) 20 MG tablet; Take 1 tablet (20 mg total) by mouth daily as needed (edema).  Dispense: 30 tablet; Refill: 2  -     lisinopriL (PRINIVIL,ZESTRIL) 20 MG tablet; Take 1 tablet (20 mg total) by mouth once daily.  Dispense: 90 tablet; Refill: 0  FINDINGS:  Heart size enlarged and similar.  Lungs clear.  No pneumothorax or pleural effusion.     Impression:     The cardiomegaly is similar.  No acute abnormality identified.            Chronic conditions status updated as per HPI.  Other than changes above, cont current medications and maintain follow up with specialists.  Return to clinic in 1 months.    Patient Instructions    -   follow dash diet        -    increase physical activity        -     monitor blood pressure at home, readings should be < 130/80        -    follow up in one month      MEL[facility]

## 2024-04-09 ENCOUNTER — TELEPHONE (OUTPATIENT)
Dept: FAMILY MEDICINE | Facility: CLINIC | Age: 49
End: 2024-04-09
Payer: COMMERCIAL

## 2024-04-09 ENCOUNTER — PATIENT OUTREACH (OUTPATIENT)
Dept: ADMINISTRATIVE | Facility: HOSPITAL | Age: 49
End: 2024-04-09

## 2024-04-09 NOTE — PROGRESS NOTES
Population Health Chart Review & Patient Outreach Details      Further Action Needed If Patient Returns Outreach:        24 upload recent clinics notes from [facility] [place] [facility] to  and provider    Updates Requested / Reviewed:     []  Care Everywhere    []     []  External Sources ([facility], [facility], [facility], etc.)    [] [facility]   [] [facility]   [] Other:    []  Care Team Updated   []  Removed  or Duplicate Orders   []  Immunization Reconciliation Completed / Queried    [] [place]   [] [place]   [] [place]   [] [place]      Health Maintenance Topics Addressed and Outreach Outcomes / Actions Taken:             Breast Cancer Screening []  Mammogram Order Placed    []  Mammogram Screening Scheduled    []  External Records Requested & Care Team Updated if Applicable    []  External Records Uploaded & Care Team Updated if Applicable    []  Pt Declined Scheduling Mammogram    []  Pt Will Schedule with External Provider / Order Routed & Care Team Updated if Applicable              Cervical Cancer Screening []  Pap Smear Scheduled in Primary Care or OBGYN    []  External Records Requested & Care Team Updated if Applicable       []  External Records Uploaded, Care Team Updated, & History Updated if Applicable    []  Patient Declined Scheduling Pap Smear    []  Patient Will Schedule with External Provider & Care Team Updated if Applicable                  Colorectal Cancer Screening []  Colonoscopy Case Request / Referral / Home Test Order Placed    []  External Records Requested & Care Team Updated if Applicable    []  External Records Uploaded, Care Team Updated, & History Updated if Applicable    []  Patient Declined Completing Colon Cancer Screening    []  Patient Will Schedule with External Provider & Care Team Updated if Applicable    []  Fit Kit Mailed (add the SmartPhrase under additional notes)    []  Reminded Patient to Complete Home Test                Diabetic Eye Exam  []  Eye Exam Screening Order Placed    []  Eye Camera Scheduled or Optometry/Ophthalmology Referral Placed    []  External Records Requested & Care Team Updated if Applicable    []  External Records Uploaded, Care Team Updated, & History Updated if Applicable    []  Patient Declined Scheduling Eye Exam    []  Patient Will Schedule with External Provider & Care Team Updated if Applicable             Blood Pressure Control []  Primary Care Follow Up Visit Scheduled     []  Remote Blood Pressure Reading Captured    []  Patient Declined Remote Reading or Scheduling Appt - Escalated to PCP    []  Patient Will Call Back or Send Portal Message with Reading                 HbA1c & Other Labs []  Overdue Lab(s) Ordered    []  Overdue Lab(s) Scheduled    []  External Records Uploaded & Care Team Updated if Applicable    []  Primary Care Follow Up Visit Scheduled     []  Reminded Patient to Complete A1c Home Test    []  Patient Declined Scheduling Labs or Will Call Back to Schedule    []  Patient Will Schedule with External Provider / Order Routed, & Care Team Updated if Applicable           Primary Care Appointment []  Primary Care Appt Scheduled    []  Patient Declined Scheduling or Will Call Back to Schedule    []  Pt Established with External Provider, Updated Care Team, & Informed Pt to Notify Payor if Applicable           Medication Adherence /    Statin Use []  Primary Care Appointment Scheduled    []  Patient Reminded to  Prescription    []  Patient Declined, Provider Notified if Needed    []  Sent Provider Message to Review to Evaluate Pt for Statin, Add Exclusion Dx Codes, Document   Exclusion in Problem List, Change Statin Intensity Level to Moderate or High Intensity if Applicable                Osteoporosis Screening []  Dexa Order Placed    []  Dexa Appointment Scheduled    []  External Records Requested & Care Team Updated    []  External Records Uploaded, Care Team Updated, & History Updated if  Applicable    []  Patient Declined Scheduling Dexa or Will Call Back to Schedule    []  Patient Will Schedule with External Provider / Order Routed & Care Team Updated if Applicable       Additional Notes:

## 2024-04-10 ENCOUNTER — TELEPHONE (OUTPATIENT)
Dept: FAMILY MEDICINE | Facility: CLINIC | Age: 49
End: 2024-04-10
Payer: COMMERCIAL

## 2024-04-10 DIAGNOSIS — I51.7 CARDIOMEGALY: Primary | ICD-10-CM

## 2024-04-11 DIAGNOSIS — M54.16 LUMBAR RADICULOPATHY: Primary | ICD-10-CM

## 2024-04-12 ENCOUNTER — HOSPITAL ENCOUNTER (OUTPATIENT)
Dept: RADIOLOGY | Facility: HOSPITAL | Age: 49
Discharge: HOME | End: 2024-04-12
Attending: ORTHOPAEDIC SURGERY
Payer: COMMERCIAL

## 2024-04-12 ENCOUNTER — OFFICE VISIT (OUTPATIENT)
Dept: SPINE | Facility: CLINIC | Age: 49
End: 2024-04-12
Payer: COMMERCIAL

## 2024-04-12 DIAGNOSIS — M54.16 LUMBAR RADICULOPATHY: ICD-10-CM

## 2024-04-12 DIAGNOSIS — M16.0 BILATERAL HIP JOINT ARTHRITIS: Primary | ICD-10-CM

## 2024-04-12 PROCEDURE — 99204 OFFICE O/P NEW MOD 45 MIN: CPT | Mod: S$PBB,,, | Performed by: ORTHOPAEDIC SURGERY

## 2024-04-12 PROCEDURE — 1159F MED LIST DOCD IN RCRD: CPT | Mod: CPTII,,, | Performed by: ORTHOPAEDIC SURGERY

## 2024-04-12 PROCEDURE — 3044F HG A1C LEVEL LT 7.0%: CPT | Mod: CPTII,,, | Performed by: ORTHOPAEDIC SURGERY

## 2024-04-12 PROCEDURE — 72110 X-RAY EXAM L-2 SPINE 4/>VWS: CPT | Mod: 26,,, | Performed by: ORTHOPAEDIC SURGERY

## 2024-04-12 PROCEDURE — 4010F ACE/ARB THERAPY RXD/TAKEN: CPT | Mod: CPTII,,, | Performed by: ORTHOPAEDIC SURGERY

## 2024-04-12 PROCEDURE — 72110 X-RAY EXAM L-2 SPINE 4/>VWS: CPT | Mod: TC

## 2024-04-12 PROCEDURE — 99215 OFFICE O/P EST HI 40 MIN: CPT | Mod: PBBFAC,25 | Performed by: ORTHOPAEDIC SURGERY

## 2024-04-12 RX ORDER — MELOXICAM 7.5 MG/1
7.5 TABLET ORAL DAILY
Qty: 60 TABLET | Refills: 3 | Status: SHIPPED | OUTPATIENT
Start: 2024-04-12

## 2024-04-12 NOTE — PROGRESS NOTES
MDM/time:  Greater than 45 minutes spent on this encounter including 15 minutes reviewing imaging and notes, 20 minutes with the patient, 10 minutes documentation    ASSESSMENT:  48 y.o. male with lumbar spondylosis with radiculopathy, severe Right (possible AVN) and mod-severe left hip arthritis     PLAN:  Referral to orthopedic - bilateral hip pain   Set up bilateral hip injections with radiology   Mobic 7.5mg 1 tab BID (hold off on other NSAIDS)  PT lumbar spine and bilateral hip   Follow up 6 months       HPI:  48 y.o. male here for evaluation of bilateral hip pain that radiates into groin and gluteal fold.  Patient reports he has had a history of pain for the past 3 years but most recently finds it difficult to stand or walk.  No difficulty with  strength.  Balance issues with multiple falls.  Denies bladder bowel incontinence.  Unable to stand and walk for more than 10-25 steps due to pain.  Currently taking Neurontin 300 mg 1 tablet twice a day but this makes him drowsy.  No recent physical therapy.  No prior pain management.  No recent MRI.  No prior spine surgery.  Patient currently smokes 3 cigarettes per day.    IMAGING:  AP, lateral, flexion/extension views of the lumbar spine reviewed     On the AP there is normal coronal alignment.  There are 5 non-rib-bearing lumbar vertebrae.  On the lateral there is maintained lumbar lordosis.  There is spondylotic disease with decreased disc height and osteophyte formation noted.  No fractures or listhesis noted.  No instability on flexion-extension views.     Impression:  Spondylotic changes of the lumbar spine as noted above    Past Medical History:   Diagnosis Date    Fatigue     Hypertension     Shortness of breath     Sleep apnea, unspecified     Wheezing      Past Surgical History:   Procedure Laterality Date    ANGIOGRAM, CORONARY, WITH LEFT HEART CATHETERIZATION Left 4/21/2021    Procedure: Left heart cath w/ angiograms;  Surgeon: Toby ARNETT  MD Oj;  Location: [facility] CATH LAB;  Service: Cardiology;  Laterality: Left;    HAND SURGERY      per Dr Castillo     Social History     Tobacco Use    Smoking status: Every Day     Types: Cigarettes    Smokeless tobacco: Never    Tobacco comments:     quit 2 weeks ago when he became sob   Substance Use Topics    Alcohol use: Not Currently     Alcohol/week: 1.0 standard drink of alcohol     Types: 1 Cans of beer per week     Comment: occasionally    Drug use: Yes     Types: Marijuana      Current Outpatient Medications   Medication Instructions    aspirin 325 mg, Oral, Daily    carvediloL (COREG) 25 mg, Oral, 2 times daily    furosemide (LASIX) 20 mg, Oral, Daily PRN    gabapentin (NEURONTIN) 300 mg, Oral, 2 times daily    ibuprofen (ADVIL,MOTRIN) 800 mg, Oral, 3 times daily    lisinopriL (PRINIVIL,ZESTRIL) 20 mg, Oral, Daily    potassium chloride (KLOR-CON) 10 MEQ TbSR 10 mEq, Oral, Daily    spironolactone (ALDACTONE) 25 mg, Oral, Daily        EXAM:  Constitutional  General Appearance:  There is no height or weight on file to calculate BMI., NAD  Psychiatric   Orientation: Oriented to time, oriented to place, oriented to person  Mood and Affect: Active and alert, normal mood, normal affect  Gait and Station   Appearance:  Antalgic gait. Unable to tandem gait, unable to walk on toes, unable to walk on heels    LUMBAR  Musculoskeletal System   Hips: Normal appearance, no leg length discrepancy, painful decreased motion; left, painful decreased motion; right    Lumbar Spine                   Inspection:  Normal alignment, normal sagittal balance                  Range of motion:  Decreased flexion, extension, lateral bending, rotation. Pain with range of motion                  Bony Palpation of the Lumbar Spine:  No tenderness of the spinous process, no tenderness of the sacrum, no tenderness of the coccyx                  Bony Palpation of the Right Hip:  No tenderness of the iliac crest, no tenderness of the  sciatic notch, no tenderness of the SI joint                  Bony Palpation of the Left Hip:  No tenderness of the iliac crest, no tenderness of the sciatic notch, no tenderness of the SI joint                  Soft Tissue Palpation on the Right:  No tenderness of the paraspinal region, no tenderness of the iliolumbar region                  Soft Tissue Palpation on the Left:  No tenderness of the paraspinal region, no tenderness of the iliolumbar region    Motor Strength   L1 Right:  Hip flexion iliopsoas 2/5    L1 Left:  Hip flexion iliopsoas 2/5              L2-L4 Right:  Knee extension quadriceps 4/5, tibialis anterior 5/5              L2-L4 Left:  Knee extension quadriceps 4/5, tibialis anterior 5/5   L5 Right:  Extensor hallucis llongus 5/5,    L5 Left:  Extensor hallucis longus 5/5,    S1 Right:  Plantar flexion gastrocnemius 5/5   S1 Left:  Plantar flexion gastrocnemius 5/5    Neurological System   Ankle Reflex Right:  normal   Ankle Reflex Left: normal   Knee Reflex Right:  normal   Knee Reflex Left:  normal   Sensation on the Right:  L2 normal, L3 normal, L4 normal, L5 normal, S1 normal   Sensation on the Left:  L2 normal, L3 normal, L4 normal, L5 normal, S1 normal              Special Test on the Right:  Seated straight leg raising test negative, no clonus of the ankle              Special Test on the Left:  Seated straight leg raising test negative, no clonus of the ankle    Skin   Lumbosacral Spine:  Normal skin    Cardiovascular System   Arterial Pulses Right:  Posterior tibialis normal, dorsalis pedis normal   Arterial Pulses Left:  Posterior tibialis normal, dorsalis pedis normal   Edema Right: None   Edema Left:  None

## 2024-04-12 NOTE — PROGRESS NOTES
AP, lateral, flexion/extension views of the lumbar spine reviewed    On the AP there is normal coronal alignment.  There are 5 non-rib-bearing lumbar vertebrae.  On the lateral there is maintained lumbar lordosis.  There is spondylotic disease with decreased disc height and osteophyte formation noted.  No fractures or listhesis noted.  No instability on flexion-extension views.    Impression:  Spondylotic changes of the lumbar spine as noted above

## 2024-04-15 DIAGNOSIS — M25.552 BILATERAL HIP PAIN: Primary | ICD-10-CM

## 2024-04-15 DIAGNOSIS — M25.551 BILATERAL HIP PAIN: Primary | ICD-10-CM

## 2024-04-16 ENCOUNTER — CLINICAL SUPPORT (OUTPATIENT)
Dept: REHABILITATION | Facility: HOSPITAL | Age: 49
End: 2024-04-16
Payer: COMMERCIAL

## 2024-04-16 DIAGNOSIS — R53.1 DECREASED STRENGTH: ICD-10-CM

## 2024-04-16 DIAGNOSIS — M54.16 LUMBAR RADICULOPATHY: Primary | ICD-10-CM

## 2024-04-16 DIAGNOSIS — R52 PAIN: ICD-10-CM

## 2024-04-16 LAB — NONINV COLON CA DNA+OCC BLD SCRN STL QL: POSITIVE

## 2024-04-16 PROCEDURE — 97162 PT EVAL MOD COMPLEX 30 MIN: CPT

## 2024-04-16 NOTE — PLAN OF CARE
[facility]  Physical Therapy Initial Evaluation    Date: 4/16/2024   Name: Ivania Whatley  Clinic Number: [id number]    Therapy Diagnosis:   Encounter Diagnosis   Name Primary?    Lumbar radiculopathy      Physician: Dionne Stein FNP    Physician Orders: PT Eval and Treat   Medical Diagnosis from Referral: LBP  Evaluation Date: 4/16/2024  Authorization Period Expiration: 3/25/25  Plan of Care Expiration: 7/9/24  Visit # / Visits authorized: 1/ ?    Time In: 200  Time Out: 230  Total Appointment Time (timed & untimed codes): 30 minutes    Precautions: Standard and CHF    Subjective   Date of onset: Chronic condition ~ 3 years     History of current condition - IVANIA reports: Constant sharp shooting pain in the low back and both hips and worsens with sit to stand transfer while trying to walk. Prolonged positions are very painful. Right handed. No falls but the legs will occasionally buckle but the right is worse than left. Denies numbness/tingling. Denies sensation deficits. Occasionally wake at night from pain. Relief with pain medication and lie down.   Hobbies: would like to fish and hang with friends but the pain limits.   Currently off work - haven't worked in 2 years. Would like to transport patient's to appointments   Appointment tomorrow at orthopedics to rule out AVN of the hips.      Medical History:   Past Medical History:   Diagnosis Date    Fatigue     Hypertension     Shortness of breath     Sleep apnea, unspecified     Wheezing        Surgical History:   Ivania Whatley  has a past surgical history that includes Hand surgery and ANGIOGRAM, CORONARY, WITH LEFT HEART CATHETERIZATION (Left, 4/21/2021).    Medications:   Ivania has a current medication list which includes the following prescription(s): aspirin, carvedilol, furosemide, gabapentin, lisinopril, meloxicam, potassium chloride, and spironolactone.    Allergies:   Review of patient's allergies indicates:   Allergen  Reactions    Shrimp Swelling        Imaging, bone scan films: AP, lateral, flexion/extension views of the lumbar spine reviewed  On the AP there is normal coronal alignment.  There are 5 non-rib-bearing lumbar vertebrae.  On the lateral there is maintained lumbar lordosis.  There is spondylotic disease with decreased disc height and osteophyte formation noted.  No fractures or listhesis noted.  No instability on flexion-extension views.  Impression:  Spondylotic changes of the lumbar spine as noted above       Pain:  Current 7/10, worst 10/10,  Location: bilateral back    Description: Burning, Sharp, and Shooting  Aggravating Factors: Sitting, Standing, Bending, Walking, Night Time, Flexing, and Lifting  Easing Factors: pain medication, hot bath, and rest        Objective     Level pelvis    Posture:  Keeps left leg unweighted in static stance but shifts constantly    Gait:  Significant antalgic gait. Heavy shifting side to side attempting to unweight hips quickly with each step    Manual muscle test:  Unable to test secondary to very high pain level - pain in each hip with all testing     Palpation:  No tenderness with palpation    Sensation:  Light touch intact for bilateral extremities dermatome     Special Tests:   Unable to test secondary to high pain level with all positions    Difficulty with all bed mobility - high pain level    Limitation/Restriction for FOTO Survey    Therapist reviewed FOTO scores for Ivania Whatley on 4/16/2024.   FOTO documents entered into [employer] - see Media section.    Intake Score: 26%       Home Exercises and Patient Education Provided    Education provided:   Eval Findings  Patient educated on biomechanical justification for therapeutic exercise and importance of compliance with HEP in order to improve overall impairments and QOL   Patient was educated on all the above exercise prior/during/after for proper posture, positioning, and execution for safe performance with home exercise  program if exercises were given during evaluation.  Patient educated on the importance of improved core and upper and lower extremity strength in order to improve alignment of the spine and upper and lower extremities with static positions and dynamic movement.   Patient educated on the importance of strong core and lower extremity musculature in order to improve both static and dynamic balance, improve gait mechanics, reduce fall risk and improve household and community mobility.     Written Home Exercises Provided:  - . Evaluation findings discussed.  Exercises were reviewed and TIAGO was able to demonstrate them prior to the end of the session.  TIAGO demonstrated good  understanding of the education provided.     See EMR under  -  for exercises provided  - .    Assessment   Tiago is a 48 y.o. male referred to outpatient Physical Therapy with a medical diagnosis of lumbar radiculopathy. Patient presents with very high pain level. Patient has difficulty with bed mobility and all transfers secondary to extreme hip pain. Patient was unable to tolerate manual muscle testing and any special testing. There was no tenderness with palpation. Patient has an appointment tomorrow with orthopedics to rule out possible AVN.  Patient does present with AVN symptoms. The patient will need to begin therapy with aquatics secondary to not being able to tolerate land based activity at this time. Will progress patient to land based as able.     Patient prognosis is Fair.     Patient will benefit from skilled outpatient Physical Therapy to address the deficits stated above and in the chart below, provide patient /family education, and to maximize patientt's level of independence.     Plan of care discussed with patient: Yes  Patient's spiritual, cultural and educational needs considered and patient is agreeable to the plan of care and goals as stated below:     Anticipated Barriers for therapy: possible AVN      Medical Necessity  is demonstrated by the following  History  Co-morbidities and personal factors that may impact the plan of care [x] LOW: no personal factors / co-morbidities  [] MODERATE: 1-2 personal factors / co-morbidities  [] HIGH: 3+ personal factors / co-morbidities    Moderate / High Support Documentation:   Co-morbidities affecting plan of care: -    Personal Factors:   no deficits     Examination  Body Structures and Functions, activity limitations and participation restrictions that may impact the plan of care [] LOW: addressing 1-2 elements  [x] MODERATE: 3+ elements  [] HIGH: 4+ elements (please support below)    Moderate / High Support Documentation: radicular symptoms, high pain level, lower extremities weakness      Clinical Presentation [] LOW: stable  [x] MODERATE: Evolving  [] HIGH: Unstable     Decision Making/ Complexity Score: moderate         Goals:  Short Term Goals: 6 weeks   Patient will be able to sleep ~ 3 hours without waking from pain  Patient will improve manual muscle test to being able to tolerate testing for a measurement  Patient will maintain prolonged positions x 15 minutes with 4 /10 pain  Patient will report a change in pain symptoms from constant to intermittent     Long Term Goals: 12 weeks   Patient will be able to sleep through the night without waking from pain  Patient will sit to stand with one attempt  Patient will maintain prolonged positions x 30 minutes with  2 /10 pain  Patient will report no radicular symptoms by D/C  Patient will be independent with home exercise program by D/C    Plan   Plan of care Certification: 4/16/2024 to 7/9/24.    Outpatient Physical Therapy 2 times weekly for 12 weeks to include the following interventions: Aquatic Therapy, Hybresis with Dex, Cervical/Lumbar Traction, Electrical Stimulation IFC/Premod, Gait Training, Manual Therapy, Moist Heat/ Ice, Neuromuscular Re-ed, Patient Education, Self Care, Therapeutic Activities, Therapeutic Exercise,  Ultrasound, and Dry Needling.     RAMO BRASWELL, PT        I CERTIFY THE NEED FOR THESE SERVICES FURNISHED UNDER THIS PLAN OF TREATMENT AND WHILE UNDER MY CARE.    Physician's comments:      Physician's Signature: ____________________________________________Date________________        Please fax this MD signed form to:  [facility]  [phone] fax    Clinical Information for Insurance Authorization     Dates of Services: 04/16/24 to 07/09/24  Discharge Plan: Patient will be discharged from skilled physical therapy treatment once all goals have been met, patient has plateaued, or physician/insurance requests discontinuation of care. Patient will be discharged with a home exercise program.   Type of therapy: Rehabilitative  ICD-10 Diagnosis Code(s): M54.16   Which side is symptomatic? Not applicable and/or symptoms are not localized  Surgical: No  Surgical procedure: N/A  Surgery date: N/A.  Presenting symptoms/diagnoses are unspecified in nature.  Presenting symptoms are radiating in nature.   The rehabilitation is not related to a diagnosis of cancer.  The rehabilitation is not related to a diagnosis of lymphedema.  Patient's clinical presentation is:  Severe objective and functional deficits: consistent intense symptoms with severe loss of range of motion, strength, or ability to perform daily tasks  CPT Codes Requested:  57097 [therapeutic exercise], 98989 [neuromuscular re-education], 20487 [manual therapy], 22348 [aquatic therapy], 93107 [ultrasound], 15015 [unattended electrical stimulation], and 98901 [mechanical traction]     3 units per visit - 72 total for 49138, 35007, 78774  2 units per visit - 48 total for 75562  1 unit per visit - 24 total for 67723, 60444, 25910

## 2024-04-17 ENCOUNTER — TELEPHONE (OUTPATIENT)
Dept: FAMILY MEDICINE | Facility: CLINIC | Age: 49
End: 2024-04-17
Payer: COMMERCIAL

## 2024-04-17 ENCOUNTER — HOSPITAL ENCOUNTER (OUTPATIENT)
Dept: RADIOLOGY | Facility: HOSPITAL | Age: 49
Discharge: HOME | End: 2024-04-17
Attending: NURSE PRACTITIONER
Payer: COMMERCIAL

## 2024-04-17 ENCOUNTER — OFFICE VISIT (OUTPATIENT)
Dept: ORTHOPEDICS | Facility: CLINIC | Age: 49
End: 2024-04-17
Payer: COMMERCIAL

## 2024-04-17 DIAGNOSIS — R19.5 POSITIVE COLORECTAL CANCER SCREENING USING COLOGUARD TEST: Primary | ICD-10-CM

## 2024-04-17 DIAGNOSIS — M25.552 BILATERAL HIP PAIN: ICD-10-CM

## 2024-04-17 DIAGNOSIS — M25.551 BILATERAL HIP PAIN: ICD-10-CM

## 2024-04-17 DIAGNOSIS — M16.0 OSTEOARTHRITIS OF BOTH HIPS, UNSPECIFIED OSTEOARTHRITIS TYPE: Primary | ICD-10-CM

## 2024-04-17 DIAGNOSIS — M16.0 BILATERAL HIP JOINT ARTHRITIS: ICD-10-CM

## 2024-04-17 PROCEDURE — 99213 OFFICE O/P EST LOW 20 MIN: CPT | Mod: PBBFAC,25 | Performed by: NURSE PRACTITIONER

## 2024-04-17 PROCEDURE — 1159F MED LIST DOCD IN RCRD: CPT | Mod: CPTII,,, | Performed by: NURSE PRACTITIONER

## 2024-04-17 PROCEDURE — 73522 X-RAY EXAM HIPS BI 3-4 VIEWS: CPT | Mod: 26,,, | Performed by: RADIOLOGY

## 2024-04-17 PROCEDURE — 99203 OFFICE O/P NEW LOW 30 MIN: CPT | Mod: S$PBB,,, | Performed by: NURSE PRACTITIONER

## 2024-04-17 PROCEDURE — 3044F HG A1C LEVEL LT 7.0%: CPT | Mod: CPTII,,, | Performed by: NURSE PRACTITIONER

## 2024-04-17 PROCEDURE — 73522 X-RAY EXAM HIPS BI 3-4 VIEWS: CPT | Mod: TC

## 2024-04-17 PROCEDURE — 4010F ACE/ARB THERAPY RXD/TAKEN: CPT | Mod: CPTII,,, | Performed by: NURSE PRACTITIONER

## 2024-04-17 NOTE — TELEPHONE ENCOUNTER
----- Message from MELODY Mir sent at 4/17/2024  9:18 AM CDT -----  Tell pt home cologuard was positive for blood and recommended to follow up with a colonoscopy.  I've ordered this.

## 2024-04-17 NOTE — PROGRESS NOTES
ASSESSMENT:      ICD-10-CM ICD-9-CM   1. Bilateral hip joint arthritis  M16.0 716.95       PLAN:     -Findings and treatment options were discussed with the patient  -All questions answered  Natural history and expected course discussed. Questions answered.  Educational materials distributed.  NSAIDs per medication orders.  Start Mobic daily.  Bilateral intra-articular hip injections in Radiology.  Return to clinic 6-8 weeks for recheck.  There are no Patient Instructions on file for this visit.    IMAGING:  EXAMINATION:  XR HIP 3 OR 4 VIEWS BILATERAL     CLINICAL HISTORY:  Pain in right hip     COMPARISON:  20 June 2023     TECHNIQUE:  XR HIP 4 VIEWS BILATERAL     FINDINGS:  No evidence of fracture seen.  The alignment of the joints appears normal.  Moderate bilateral hip degenerative change is present.  No soft tissue abnormality is seen.     Impression:     Moderate hip osteoarthrosis.        Electronically signed by:Will Kearney  Date:                                            04/17/2024  Time:                                           14:14           Exam Ended: 04/17/24 14:12 CDT Last Resulted: 04/17/24 14:14 CDT                   CC: Hip pain  49 y.o. Male who presents as a new patient to me for evaluation of bilateral hip pain.    Pain has been present for 2-3 years.  Mechanism of injury: Patient reports he has had no recent injuries. He does states he has had some childhood injuries.  He has pain in his buttock and groin.  He is recently seen Dr. Leonardo for his low back.  Dr. Leonardo did send in Mobic but he has not yet picked it up.  Any rotation of his hips are painful.  Reports it is painful to go from sitting to standing.  Location of the pain: groin  Occupation:   Mechanical symptoms, such as catching and popping of the hip are present.    Symptoms are worsened with activity.  Better with rest. Treatment thus far has included rest, activity modifications, and oral medications.    he has not  had  formal physical therapy  he has not had previous advanced imaging such as MRI.   he has not  had previous hip injections.   he has not  had previous hip or back surgery.   Here today to discuss diagnosis and treatment options.        Review of Systems  All other review of symptoms were reviewed and found to be noncontributory.     PAST MEDICAL HISTORY:   Past Medical History:   Diagnosis Date    Fatigue     Hypertension     Shortness of breath     Sleep apnea, unspecified     Wheezing        PAST SURGICAL HISTORY:   Past Surgical History:   Procedure Laterality Date    ANGIOGRAM, CORONARY, WITH LEFT HEART CATHETERIZATION Left 4/21/2021    Procedure: Left heart cath w/ angiograms;  Surgeon: Toby Mcwilliams MD;  Location: [facility] CATH LAB;  Service: Cardiology;  Laterality: Left;    HAND SURGERY      per Dr Castillo       FAMILY HISTORY:   Family History   Problem Relation Name Age of Onset    Heart disease Mother      Heart disease Brother      Hypertension Other      Diabetes Other         SOCIAL HISTORY:   Social History     Socioeconomic History    Marital status: Single   Tobacco Use    Smoking status: Every Day     Types: Cigarettes    Smokeless tobacco: Never    Tobacco comments:     quit 2 weeks ago when he became sob   Substance and Sexual Activity    Alcohol use: Not Currently     Alcohol/week: 1.0 standard drink of alcohol     Types: 1 Cans of beer per week     Comment: occasionally    Drug use: Yes     Types: Marijuana    Sexual activity: Yes       MEDICATIONS:     Current Outpatient Medications:     aspirin 325 MG tablet, Take 1 tablet (325 mg total) by mouth once daily., Disp: 30 tablet, Rfl: 2    carvediloL (COREG) 25 MG tablet, Take 1 tablet (25 mg total) by mouth 2 (two) times daily., Disp: 180 tablet, Rfl: 0    furosemide (LASIX) 20 MG tablet, Take 1 tablet (20 mg total) by mouth daily as needed (edema)., Disp: 30 tablet, Rfl: 2    gabapentin (NEURONTIN) 300 MG capsule, Take 1 capsule (300 mg  total) by mouth 2 (two) times daily., Disp: 180 capsule, Rfl: 0    lisinopriL (PRINIVIL,ZESTRIL) 20 MG tablet, Take 1 tablet (20 mg total) by mouth once daily., Disp: 90 tablet, Rfl: 0    meloxicam (MOBIC) 7.5 MG tablet, Take 1 tablet (7.5 mg total) by mouth once daily., Disp: 60 tablet, Rfl: 3    potassium chloride (KLOR-CON) 10 MEQ TbSR, Take 1 tablet (10 mEq total) by mouth once daily., Disp: 30 tablet, Rfl: 2    spironolactone (ALDACTONE) 25 MG tablet, Take 1 tablet (25 mg total) by mouth once daily., Disp: 90 tablet, Rfl: 0    ALLERGIES:   Review of patient's allergies indicates:   Allergen Reactions    Shrimp Swelling        PHYSICAL EXAMINATION:  There were no vitals taken for this visit.  General    Nursing note and vitals reviewed.  Constitutional: He is oriented to person, place, and time. He appears well-developed and well-nourished.   HENT:   Head: Normocephalic and atraumatic.   Nose: Nose normal.   Eyes: EOM are normal. Pupils are equal, round, and reactive to light.   Neck: Neck supple.   Cardiovascular:  Normal rate and intact distal pulses.            Pulmonary/Chest: Effort normal. No respiratory distress. He exhibits no tenderness.   Abdominal: Soft. He exhibits no distension. There is no abdominal tenderness.   Neurological: He is alert and oriented to person, place, and time. He has normal reflexes. He displays normal reflexes. No cranial nerve deficit. He exhibits normal muscle tone. Coordination normal.   Psychiatric: He has a normal mood and affect. His behavior is normal. Judgment and thought content normal.     General Musculoskeletal Exam   Pelvic Obliquity: none        Right Hip Exam   Right hip exam is normal.     Inspection   Swelling: absent  Bruising: absent    Range of Motion   Extension:  abnormal   Flexion:  abnormal   External rotation:  abnormal   Internal rotation:  abnormal     Tests   Pain w/ forced internal rotation (HANK): present  Pain w/ forced external rotation (FADIR):  present  Circumduction test: positive  Log Roll: positive    Other   Sensation: normal  Left Hip Exam     Inspection   Swelling: absent  Bruising: absent    Tenderness   The patient tender to palpation of the trochanteric bursa.    Range of Motion   Extension:  normal   Flexion:  normal   External rotation:  normal   Internal rotation: normal     Tests   Pain w/ forced internal rotation (HANK): present  Pain w/ forced external rotation (FADIR): present  Circumduction test: positive  Log Roll: positive    Other   Sensation: normal      Back (L-Spine & T-Spine) / Neck (C-Spine) Exam   Back exam is normal.    Back (L-Spine & T-Spine) Range of Motion   The patient has abnormal back ROM.      Vascular Exam     Right Pulses  Dorsalis Pedis:      2+          Left Pulses  Dorsalis Pedis:      2+          No orders of the defined types were placed in this encounter.    Procedures

## 2024-04-19 DIAGNOSIS — R19.5 POSITIVE COLORECTAL CANCER SCREENING USING COLOGUARD TEST: Primary | ICD-10-CM

## 2024-04-22 ENCOUNTER — OFFICE VISIT (OUTPATIENT)
Dept: CARDIOLOGY | Facility: CLINIC | Age: 49
End: 2024-04-22
Payer: COMMERCIAL

## 2024-04-22 VITALS
DIASTOLIC BLOOD PRESSURE: 110 MMHG | HEART RATE: 78 BPM | WEIGHT: 270 LBS | RESPIRATION RATE: 16 BRPM | SYSTOLIC BLOOD PRESSURE: 180 MMHG | HEIGHT: 68 IN | BODY MASS INDEX: 40.92 KG/M2

## 2024-04-22 DIAGNOSIS — I50.22 CHRONIC SYSTOLIC HEART FAILURE: Primary | ICD-10-CM

## 2024-04-22 DIAGNOSIS — I10 UNCONTROLLED HYPERTENSION: ICD-10-CM

## 2024-04-22 DIAGNOSIS — I10 UNCONTROLLED HYPERTENSION: Primary | ICD-10-CM

## 2024-04-22 DIAGNOSIS — I50.9 HEART FAILURE, UNSPECIFIED HF CHRONICITY, UNSPECIFIED HEART FAILURE TYPE: ICD-10-CM

## 2024-04-22 PROCEDURE — 1159F MED LIST DOCD IN RCRD: CPT | Mod: CPTII,,, | Performed by: STUDENT IN AN ORGANIZED HEALTH CARE EDUCATION/TRAINING PROGRAM

## 2024-04-22 PROCEDURE — 4010F ACE/ARB THERAPY RXD/TAKEN: CPT | Mod: CPTII,,, | Performed by: STUDENT IN AN ORGANIZED HEALTH CARE EDUCATION/TRAINING PROGRAM

## 2024-04-22 PROCEDURE — 3044F HG A1C LEVEL LT 7.0%: CPT | Mod: CPTII,,, | Performed by: STUDENT IN AN ORGANIZED HEALTH CARE EDUCATION/TRAINING PROGRAM

## 2024-04-22 PROCEDURE — 3008F BODY MASS INDEX DOCD: CPT | Mod: CPTII,,, | Performed by: STUDENT IN AN ORGANIZED HEALTH CARE EDUCATION/TRAINING PROGRAM

## 2024-04-22 PROCEDURE — 93010 ELECTROCARDIOGRAM REPORT: CPT | Mod: S$PBB,,, | Performed by: STUDENT IN AN ORGANIZED HEALTH CARE EDUCATION/TRAINING PROGRAM

## 2024-04-22 PROCEDURE — 3080F DIAST BP >= 90 MM HG: CPT | Mod: CPTII,,, | Performed by: STUDENT IN AN ORGANIZED HEALTH CARE EDUCATION/TRAINING PROGRAM

## 2024-04-22 PROCEDURE — 3077F SYST BP >= 140 MM HG: CPT | Mod: CPTII,,, | Performed by: STUDENT IN AN ORGANIZED HEALTH CARE EDUCATION/TRAINING PROGRAM

## 2024-04-22 PROCEDURE — 93005 ELECTROCARDIOGRAM TRACING: CPT | Mod: PBBFAC | Performed by: STUDENT IN AN ORGANIZED HEALTH CARE EDUCATION/TRAINING PROGRAM

## 2024-04-22 PROCEDURE — 99204 OFFICE O/P NEW MOD 45 MIN: CPT | Mod: S$PBB,,, | Performed by: STUDENT IN AN ORGANIZED HEALTH CARE EDUCATION/TRAINING PROGRAM

## 2024-04-22 PROCEDURE — 99214 OFFICE O/P EST MOD 30 MIN: CPT | Mod: PBBFAC | Performed by: STUDENT IN AN ORGANIZED HEALTH CARE EDUCATION/TRAINING PROGRAM

## 2024-04-22 RX ORDER — FUROSEMIDE 20 MG/1
20 TABLET ORAL DAILY
Qty: 90 TABLET | Refills: 3 | Status: SHIPPED | OUTPATIENT
Start: 2024-04-22 | End: 2025-04-22

## 2024-04-22 RX ORDER — LISINOPRIL 20 MG/1
20 TABLET ORAL DAILY
Qty: 90 TABLET | Refills: 3 | Status: SHIPPED | OUTPATIENT
Start: 2024-04-22 | End: 2025-04-22

## 2024-04-22 RX ORDER — HYDRALAZINE HYDROCHLORIDE 50 MG/1
50 TABLET, FILM COATED ORAL EVERY 12 HOURS
Qty: 180 TABLET | Refills: 3 | Status: SHIPPED | OUTPATIENT
Start: 2024-04-22 | End: 2025-04-22

## 2024-04-22 NOTE — PROGRESS NOTES
PCP: Dionne Stein FNP    Referring Provider:     Subjective:   Ivania Whatley is a 49 y.o. male with hx of DOMENICA on CPAP, smoker, non-ischemic cardiomyopathy (EF20-25%) who presents to [facility] care.    Patient was lost to follow up. Last seen in 2021.    Today, her reports doing well. He denies any leg swelling, SOB, orthopnea.     He is hypertensive in clinic but reports having hip pain.     Fhx:  Shx:     EKG - 4/22/24 - NSR, TWI in inferior lease    ECHO - Results for orders placed during the hospital encounter of 04/20/21    Echo Color Flow Doppler? Yes; Bubble Contrast? No    Interpretation Summary  · The left ventricle is moderately enlarged with eccentric hypertrophy.  · The estimated ejection fraction is 20%.  · Mild right ventricular enlargement with mildly to moderately reduced right ventricular systolic function.  · Mild-to-moderate aortic regurgitation.  · Mild mitral regurgitation.  · Mild tricuspid regurgitation.  · Mild to moderate pulmonic regurgitation.  · Elevated central venous pressure (15 mmHg).  · Mild left atrial enlargement.      CATH - Results for orders placed during the hospital encounter of 04/20/21    Cardiac catheterization    Conclusion  · The coronary arteries were normal..  · The left ventricular end diastolic pressure was severely elevated.  · The post-procedure left ventricular end diastolic pressure was 45.  · Continue IV diuresis  · Needs workup for non-ischemic cardiomyopathy    The procedure log was documented by Documenter: RT Anita and verified by Toby Mcwilliams MD.    Date: 4/21/2021  Time: 10:43 AM        Lab Results   Component Value Date     03/25/2024    K 3.9 03/25/2024     03/25/2024    CO2 31 03/25/2024    BUN 12 03/25/2024    CREATININE 0.81 03/25/2024    CALCIUM 9.4 03/25/2024    ANIONGAP 8 03/25/2024    ESTGFRAFRICA 92 04/22/2021       Lab Results   Component Value Date    CHOL 199 03/25/2024    CHOL 123 04/21/2021     Lab  "Results   Component Value Date    HDL 46 03/25/2024    HDL 36 (L) 04/21/2021     Lab Results   Component Value Date    LDLCALC 130 03/25/2024    LDLCALC 70 04/21/2021     Lab Results   Component Value Date    TRIG 113 03/25/2024    TRIG 84 04/21/2021     Lab Results   Component Value Date    CHOLHDL 4.3 03/25/2024    CHOLHDL 3.4 04/21/2021       Lab Results   Component Value Date    WBC 10.83 07/20/2023    HGB 13.9 07/20/2023    HCT 41.9 07/20/2023    MCV 85.0 07/20/2023     07/20/2023           Current Outpatient Medications:     carvediloL (COREG) 25 MG tablet, Take 1 tablet (25 mg total) by mouth 2 (two) times daily., Disp: 180 tablet, Rfl: 0    spironolactone (ALDACTONE) 25 MG tablet, Take 1 tablet (25 mg total) by mouth once daily., Disp: 90 tablet, Rfl: 0    furosemide (LASIX) 20 MG tablet, Take 1 tablet (20 mg total) by mouth once daily., Disp: 90 tablet, Rfl: 3    gabapentin (NEURONTIN) 300 MG capsule, Take 1 capsule (300 mg total) by mouth 2 (two) times daily., Disp: 180 capsule, Rfl: 0    hydrALAZINE (APRESOLINE) 50 MG tablet, Take 1 tablet (50 mg total) by mouth every 12 (twelve) hours., Disp: 180 tablet, Rfl: 3    lisinopriL (PRINIVIL,ZESTRIL) 20 MG tablet, Take 1 tablet (20 mg total) by mouth once daily., Disp: 90 tablet, Rfl: 3    meloxicam (MOBIC) 7.5 MG tablet, Take 1 tablet (7.5 mg total) by mouth once daily., Disp: 60 tablet, Rfl: 3    potassium chloride (KLOR-CON) 10 MEQ TbSR, Take 1 tablet (10 mEq total) by mouth once daily., Disp: 30 tablet, Rfl: 2    Review of Systems   Respiratory:  Negative for cough and shortness of breath.    Cardiovascular:  Negative for chest pain, palpitations, orthopnea, claudication, leg swelling and PND.         Objective:   BP (!) 180/110   Pulse 78   Resp 16   Ht 5' 8" (1.727 m)   Wt 122.5 kg (270 lb)   BMI 41.05 kg/m²     Physical Exam  Vitals and nursing note reviewed.   Constitutional:       Appearance: Normal appearance.   Cardiovascular:      Rate " and Rhythm: Normal rate and regular rhythm.      Pulses: Normal pulses.      Heart sounds: Normal heart sounds.   Pulmonary:      Breath sounds: Normal breath sounds.   Neurological:      Mental Status: He is alert and oriented to person, place, and time.           Assessment:     1. Chronic systolic heart failure        2. Uncontrolled hypertension  Ambulatory referral/consult to Cardiology    EKG 12-lead    lisinopriL (PRINIVIL,ZESTRIL) 20 MG tablet    furosemide (LASIX) 20 MG tablet    Echo    US Renal Artery Stenosis Hyperten (xpd)      3. Heart failure, unspecified HF chronicity, unspecified heart failure type  Ambulatory referral/consult to Cardiology    lisinopriL (PRINIVIL,ZESTRIL) 20 MG tablet    furosemide (LASIX) 20 MG tablet            Plan:   Chronic systolic heart failure  -  EF 20% (in 2021); Stage C, NYHA II  - Hypertensive heart disease (non-ischemic cardiomyopathy)  - S/p [facility] in 2021 - normal cor  GDMT - coreg 25mg bid, lisinopril 20mg qd, aldactone 25mg qd  Euvolemic on lasix 20mg qd  Repeat ECHO    Uncontrolled hypertension  On coreg, lisinopril, aldactone  Start hydralazine 50mg bid  Renal artery duplex ordered

## 2024-04-22 NOTE — ASSESSMENT & PLAN NOTE
-  EF 20% (in 2021); Stage C, NYHA II  - Hypertensive heart disease (non-ischemic cardiomyopathy)  - S/p [facility] in 2021 - normal cor  GDMT - coreg 25mg bid, lisinopril 20mg qd, aldactone 25mg qd  Euvolemic on lasix 20mg qd  Repeat ECHO

## 2024-04-24 ENCOUNTER — CLINICAL SUPPORT (OUTPATIENT)
Dept: REHABILITATION | Facility: HOSPITAL | Age: 49
End: 2024-04-24
Payer: COMMERCIAL

## 2024-04-24 DIAGNOSIS — R52 PAIN: Primary | ICD-10-CM

## 2024-04-24 DIAGNOSIS — R53.1 DECREASED STRENGTH: ICD-10-CM

## 2024-04-24 PROCEDURE — 97113 AQUATIC THERAPY/EXERCISES: CPT

## 2024-04-24 NOTE — PROGRESS NOTES
[facility]   Physical Therapy Treatment Note      [id number]    Therapy Diagnosis:   Encounter Diagnoses   Name Primary?    Pain Yes    Decreased strength      Physician: Dionne Stein FNP    Visit Date: 4/24/2024    Physician Orders: PT Eval and Treat   Medical Diagnosis from Referral: LBP  Evaluation Date: 4/16/2024  Authorization Period Expiration: 3/25/25  Plan of Care Expiration: 7/9/24  Visit # / Visits authorized: 1/ 13 until 7/15    PTA Visit #: 0/5     Time In: 250  Time Out: 315  Total Billable Time: 25 minutes    Subjective     Pt reports: no change from evaluation.  Took a pain pill. Laid around all day waiting for this appointment. The right hip is hurting so bad.     He was compliant with home exercise program.    Pain: 7/10  Location: bilateral hips      Objective      Objective Measures updated at progress report unless specified.     Treatment     IVANIA received the treatments listed below:      Aquatic therapeutic exercises to develop strength, endurance, ROM, flexibility, posture, and core stabilization for 25 minutes including:  Standing forward laps x 3  Standing side to side laps x 8 laps   Attempted hamstring curl in standing but unable to do  Bilateral heel raises x 20  Sitting ankle pumps x 20  Sitting hip abduction attempted but unable to do  Sitting bicycle attempted but unable to do   Sitting bilateral long arc quad with just the left x 20    manual therapy techniques: Joint mobilizations, Manual traction, Myofacial release, Soft tissue Mobilization, and Friction Massage were applied to the: - for - minutes, including:  -    neuromuscular re-education activities to improve: Balance, Coordination, Kinesthetic, Sense, Proprioception, and Posture for - minutes. The following activities were included:  -    therapeutic activities to improve functional performance for -  minutes, including:  -    gait training to  improve functional mobility and safety for -  minutes, including:  -    direct contact modalities after being cleared for contraindications:     supervised modalities after being cleared for contradictions:     hot pack for - minutes to -.    cold pack for - minutes to -.    Patient Education and Home Exercises       Education provided:   - no home exercise program given today    Written Home Exercises Provided:  - . Exercises were reviewed and TIAGO was able to demonstrate them prior to the end of the session.  TIAGO demonstrated good  understanding of the education provided. See EMR under Patient Instructions for exercises provided during therapy sessions    Assessment     Patient has a very high pain level. Most of the exercises were unable to be completed secondary to the inability to put full weight through the right leg and hip. Patient states today is a bad day for the hip and is unable to tolerate any weight. Patient did arrive with antalgic gait and slow malka. Patient did have relief while sitting and floating without any weight or exercise resistance. Did allow the patient to sit for several minutes to enjoy the less pain level while it was lasting. Will attempt aquatic next session.     TIAGO Is not progressing well towards his goals.   Pt prognosis is Fair.     Pt will continue to benefit from skilled outpatient physical therapy to address the deficits listed in the problem list box on initial evaluation, provide pt/family education and to maximize pt's level of independence in the home and community environment.     Pt's spiritual, cultural and educational needs considered and pt agreeable to plan of care and goals.     Anticipated barriers to physical therapy: chronic condition and high pain level     Goals: Short Term Goals: 6 weeks   Patient will be able to sleep ~ 3 hours without waking from pain  Patient will improve manual muscle test to being able to tolerate testing for a  measurement  Patient will maintain prolonged positions x 15 minutes with 4 /10 pain  Patient will report a change in pain symptoms from constant to intermittent     Long Term Goals: 12 weeks   Patient will be able to sleep through the night without waking from pain  Patient will sit to stand with one attempt  Patient will maintain prolonged positions x 30 minutes with  2 /10 pain  Patient will report no radicular symptoms by D/C  Patient will be independent with home exercise program by D/C    Plan     Progress toward goals. Progress exercises if able     RAMO BRASWELL, PT

## 2024-04-26 ENCOUNTER — CLINICAL SUPPORT (OUTPATIENT)
Dept: REHABILITATION | Facility: HOSPITAL | Age: 49
End: 2024-04-26
Payer: COMMERCIAL

## 2024-04-26 DIAGNOSIS — R52 PAIN: Primary | ICD-10-CM

## 2024-04-26 DIAGNOSIS — R53.1 DECREASED STRENGTH: ICD-10-CM

## 2024-04-26 PROCEDURE — 97113 AQUATIC THERAPY/EXERCISES: CPT | Mod: CQ

## 2024-04-26 NOTE — PROGRESS NOTES
[facility]   Physical Therapy Treatment Note      Name: Ivania Whatley  [facility] Number: [id number]    Therapy Diagnosis:   Encounter Diagnoses   Name Primary?    Pain Yes    Decreased strength      Physician: Dionne Stein FNP    Visit Date: 4/26/2024    Physician Orders: PT Eval and Treat   Medical Diagnosis from Referral: LBP  Evaluation Date: 4/16/2024  Authorization Period Expiration: 3/25/25  Plan of Care Expiration: 7/9/24  Visit # / Visits authorized: 3/ 13 until 7/15    PTA Visit #: 1/5     Time In: 10:23 pm   Time Out 11:00  Total Billable Time:  38 minutes    Subjective     Pt reports: no change from evaluation.  Took a pain pill.    He was compliant with home exercise program.    Pain: 7/10  Location: bilateral hips      Objective      Objective Measures updated at progress report unless specified.     Treatment     IVANIA received the treatments listed below:      Aquatic therapeutic exercises to develop strength, endurance, ROM, flexibility, posture, and core stabilization for 38 minutes including:  Standing forward laps x 3  Standing side to side laps x 8 laps   Standing marches x 5 bilateral   Attempted hamstring curl in standing but unable to do  Bilateral heel raises x 20  Sitting ankle pumps x 20  Sitting hip abduction attempted but unable to do  Sitting bicycle attempted but unable to do   Sitting bilateral long arc quad with just the left x 20  Sitting hip abduction 2 x 10   Hamstring stretch at stairs 3 x 10 second hold     manual therapy techniques: Joint mobilizations, Manual traction, Myofacial release, Soft tissue Mobilization, and Friction Massage were applied to the: - for - minutes, including:  -    neuromuscular re-education activities to improve: Balance, Coordination, Kinesthetic, Sense, Proprioception, and Posture for - minutes. The following activities were included:  -    therapeutic activities to improve functional performance for -  minutes,  including:  -    gait training to improve functional mobility and safety for -  minutes, including:  -    direct contact modalities after being cleared for contraindications:     supervised modalities after being cleared for contradictions:     hot pack for - minutes to -.    cold pack for - minutes to -.    Patient Education and Home Exercises       Education provided:   - no home exercise program given today    Written Home Exercises Provided:  - . Exercises were reviewed and TIAGO was able to demonstrate them prior to the end of the session.  TIAGO demonstrated good  understanding of the education provided. See EMR under Patient Instructions for exercises provided during therapy sessions    Assessment     Patient has a very high pain level. Most of the exercises were unable to be completed secondary to the inability to put full weight through the right leg and hip. Patient states today is a bad day for the hip and is unable to tolerate any weight. Patient did arrive with antalgic gait and slow malka. Patient did have relief while sitting and floating without any weight or exercise resistance. Did allow the patient to sit for several minutes to enjoy the less pain level while it was lasting. Will attempt aquatic next session.     TIAGO Is not progressing well towards his goals.   Pt prognosis is Fair.     Pt will continue to benefit from skilled outpatient physical therapy to address the deficits listed in the problem list box on initial evaluation, provide pt/family education and to maximize pt's level of independence in the home and community environment.     Pt's spiritual, cultural and educational needs considered and pt agreeable to plan of care and goals.     Anticipated barriers to physical therapy: chronic condition and high pain level     Goals: Short Term Goals: 6 weeks   Patient will be able to sleep ~ 3 hours without waking from pain  Patient will improve manual muscle test to being able to  tolerate testing for a measurement  Patient will maintain prolonged positions x 15 minutes with 4 /10 pain  Patient will report a change in pain symptoms from constant to intermittent     Long Term Goals: 12 weeks   Patient will be able to sleep through the night without waking from pain  Patient will sit to stand with one attempt  Patient will maintain prolonged positions x 30 minutes with  2 /10 pain  Patient will report no radicular symptoms by D/C  Patient will be independent with home exercise program by D/C    Plan     Progress toward goals. Progress exercises if able     Sahara Deluca PTA

## 2024-05-09 PROBLEM — R53.1 DECREASED STRENGTH: Status: RESOLVED | Noted: 2024-04-16 | Resolved: 2024-05-09

## 2024-05-09 PROBLEM — R52 PAIN: Status: RESOLVED | Noted: 2024-04-16 | Resolved: 2024-05-09

## 2024-05-09 NOTE — PROGRESS NOTES
Called patient to discuss no-shows.  Patient states he is in so much pain and coming out of aquatic therapy has actually made him feel worse. Patient stated he went to the MD yesterday and had to leave early since he was sitting and waiting so long and began hurting more than he could handle with sitting in the waiting room. Patient will discharge from physical therapy. Please consider this note on 4/26 as a discharge.

## 2024-05-15 ENCOUNTER — OFFICE VISIT (OUTPATIENT)
Dept: FAMILY MEDICINE | Facility: CLINIC | Age: 49
End: 2024-05-15
Payer: COMMERCIAL

## 2024-05-15 VITALS
DIASTOLIC BLOOD PRESSURE: 88 MMHG | BODY MASS INDEX: 40.92 KG/M2 | OXYGEN SATURATION: 97 % | WEIGHT: 270 LBS | SYSTOLIC BLOOD PRESSURE: 144 MMHG | RESPIRATION RATE: 17 BRPM | HEIGHT: 68 IN | HEART RATE: 86 BPM

## 2024-05-15 DIAGNOSIS — I10 UNCONTROLLED HYPERTENSION: ICD-10-CM

## 2024-05-15 DIAGNOSIS — I50.9 HEART FAILURE, UNSPECIFIED HF CHRONICITY, UNSPECIFIED HEART FAILURE TYPE: ICD-10-CM

## 2024-05-15 DIAGNOSIS — M54.16 LUMBAR RADICULOPATHY: ICD-10-CM

## 2024-05-15 PROCEDURE — 1159F MED LIST DOCD IN RCRD: CPT | Mod: CPTII,,, | Performed by: STUDENT IN AN ORGANIZED HEALTH CARE EDUCATION/TRAINING PROGRAM

## 2024-05-15 PROCEDURE — 4010F ACE/ARB THERAPY RXD/TAKEN: CPT | Mod: CPTII,,, | Performed by: STUDENT IN AN ORGANIZED HEALTH CARE EDUCATION/TRAINING PROGRAM

## 2024-05-15 PROCEDURE — 3077F SYST BP >= 140 MM HG: CPT | Mod: CPTII,,, | Performed by: STUDENT IN AN ORGANIZED HEALTH CARE EDUCATION/TRAINING PROGRAM

## 2024-05-15 PROCEDURE — 3044F HG A1C LEVEL LT 7.0%: CPT | Mod: CPTII,,, | Performed by: STUDENT IN AN ORGANIZED HEALTH CARE EDUCATION/TRAINING PROGRAM

## 2024-05-15 PROCEDURE — 3079F DIAST BP 80-89 MM HG: CPT | Mod: CPTII,,, | Performed by: STUDENT IN AN ORGANIZED HEALTH CARE EDUCATION/TRAINING PROGRAM

## 2024-05-15 PROCEDURE — 99214 OFFICE O/P EST MOD 30 MIN: CPT | Mod: ,,, | Performed by: STUDENT IN AN ORGANIZED HEALTH CARE EDUCATION/TRAINING PROGRAM

## 2024-05-15 PROCEDURE — 3008F BODY MASS INDEX DOCD: CPT | Mod: CPTII,,, | Performed by: STUDENT IN AN ORGANIZED HEALTH CARE EDUCATION/TRAINING PROGRAM

## 2024-05-15 RX ORDER — AMLODIPINE BESYLATE 10 MG/1
TABLET ORAL
COMMUNITY
Start: 2024-04-08

## 2024-05-15 RX ORDER — GABAPENTIN 300 MG/1
300 CAPSULE ORAL 2 TIMES DAILY
Qty: 180 CAPSULE | Refills: 0 | Status: SHIPPED | OUTPATIENT
Start: 2024-05-15 | End: 2025-05-15

## 2024-05-15 RX ORDER — CARVEDILOL 25 MG/1
25 TABLET ORAL 2 TIMES DAILY
Qty: 180 TABLET | Refills: 0 | Status: SHIPPED | OUTPATIENT
Start: 2024-05-15 | End: 2025-05-15

## 2024-05-15 RX ORDER — SPIRONOLACTONE 25 MG/1
25 TABLET ORAL DAILY
Qty: 90 TABLET | Refills: 0 | Status: SHIPPED | OUTPATIENT
Start: 2024-05-15 | End: 2025-05-15

## 2024-05-15 NOTE — PROGRESS NOTES
[facility]    Chief Complaint      Chief Complaint   Patient presents with    Follow-up     1 month follow up for HTN       History of Present Illness      Ivania Whatley is a 49 y.o. male with chronic conditions of radiculopathy, cardiomegaly, chf, obesity, lakeisha who presents today for htn follow up with med refills.  Is now followed by cardiology will defer htn management to them.         Past Medical History:  Past Medical History:   Diagnosis Date    Fatigue     Hypertension     Shortness of breath     Sleep apnea, unspecified     Wheezing        Past Surgical History:   has a past surgical history that includes Hand surgery and ANGIOGRAM, CORONARY, WITH LEFT HEART CATHETERIZATION (Left, 4/21/2021).    Social History:  Social History     Tobacco Use    Smoking status: Every Day     Types: Cigarettes    Smokeless tobacco: Never    Tobacco comments:     quit 2 weeks ago when he became sob   Substance Use Topics    Alcohol use: Not Currently     Alcohol/week: 1.0 standard drink of alcohol     Types: 1 Cans of beer per week     Comment: occasionally    Drug use: Yes     Types: Marijuana       I personally reviewed all past medical, surgical, and social.     Review of Systems   Constitutional:  Negative for fatigue and fever.   Respiratory:  Negative for chest tightness and shortness of breath.    Cardiovascular:  Negative for chest pain and palpitations.   Gastrointestinal:  Negative for abdominal pain.   Neurological:  Negative for headaches.        Medications:  Outpatient Encounter Medications as of 5/15/2024   Medication Sig Dispense Refill    amLODIPine (NORVASC) 10 MG tablet       furosemide (LASIX) 20 MG tablet Take 1 tablet (20 mg total) by mouth once daily. 90 tablet 3    hydrALAZINE (APRESOLINE) 50 MG tablet Take 1 tablet (50 mg total) by mouth every 12 (twelve) hours. 180 tablet 3    lisinopriL (PRINIVIL,ZESTRIL) 20 MG tablet Take 1 tablet (20 mg total) by mouth once daily. 90 tablet 3     "meloxicam (MOBIC) 7.5 MG tablet Take 1 tablet (7.5 mg total) by mouth once daily. 60 tablet 3    potassium chloride (KLOR-CON) 10 MEQ TbSR Take 1 tablet (10 mEq total) by mouth once daily. 30 tablet 2    [DISCONTINUED] carvediloL (COREG) 25 MG tablet Take 1 tablet (25 mg total) by mouth 2 (two) times daily. 180 tablet 0    [DISCONTINUED] gabapentin (NEURONTIN) 300 MG capsule Take 1 capsule (300 mg total) by mouth 2 (two) times daily. 180 capsule 0    [DISCONTINUED] spironolactone (ALDACTONE) 25 MG tablet Take 1 tablet (25 mg total) by mouth once daily. 90 tablet 0    carvediloL (COREG) 25 MG tablet Take 1 tablet (25 mg total) by mouth 2 (two) times daily. 180 tablet 0    gabapentin (NEURONTIN) 300 MG capsule Take 1 capsule (300 mg total) by mouth 2 (two) times daily. 180 capsule 0    spironolactone (ALDACTONE) 25 MG tablet Take 1 tablet (25 mg total) by mouth once daily. 90 tablet 0     No facility-administered encounter medications on file as of 5/15/2024.       Allergies:  Review of patient's allergies indicates:   Allergen Reactions    Shrimp Swelling       Health Maintenance:    There is no immunization history on file for this patient.   Health Maintenance   Topic Date Due    TETANUS VACCINE  Never done    Colorectal Cancer Screening  04/08/2027    Lipid Panel  03/25/2029    Hepatitis C Screening  Completed        Physical Exam      Vital Signs  Pulse: 86  Resp: 17  SpO2: 97 %  BP: (!) 144/88  BP Location: Right arm  Patient Position: Sitting  Height and Weight  Height: 5' 8" (172.7 cm)  Weight: 122.5 kg (270 lb)  BSA (Calculated - sq m): 2.42 sq meters  BMI (Calculated): 41.1  Weight in (lb) to have BMI = 25: 164.1]    Physical Exam  Constitutional:       Appearance: Normal appearance. He is obese.   Cardiovascular:      Rate and Rhythm: Normal rate and regular rhythm.   Pulmonary:      Effort: Pulmonary effort is normal.      Breath sounds: Normal breath sounds.   Skin:     General: Skin is warm and dry. "   Neurological:      General: No focal deficit present.      Mental Status: He is alert and oriented to person, place, and time. Mental status is at baseline.          Laboratory:  CBC:  Recent Labs   Lab 07/20/23 0133   WBC 10.83   RBC 4.93   Hemoglobin 13.9   Hematocrit 41.9   Platelet Count 313   MCV 85.0   MCH 28.2   MCHC 33.2     CMP:  Recent Labs   Lab 07/20/23 0133 03/25/24  0944   Glucose 96 80   Calcium 9.3 9.4   Albumin 3.5  --    Total Protein 8.4 H  --    Sodium 137 141   Potassium 3.7 3.9   CO2 32 31   Chloride 101 106   BUN 11 12   Alk Phos 89  --    ALT 18  --    AST 14 L  --    Bilirubin, Total 0.5  --      LIPIDS:  Recent Labs   Lab 03/25/24  0944   TSH 1.070   HDL Cholesterol 46   Cholesterol 199   Triglycerides 113   LDL Calculated 130   Cholesterol/HDL Ratio (Risk Factor) 4.3   Non-     TSH:  Recent Labs   Lab 03/25/24  0944   TSH 1.070     A1C:  Recent Labs   Lab 03/25/24 0944   Hemoglobin A1C 5.4       Assessment/Plan     Ivania Whatley is a 49 y.o.male with:    1. Uncontrolled hypertension  -     carvediloL (COREG) 25 MG tablet; Take 1 tablet (25 mg total) by mouth 2 (two) times daily.  Dispense: 180 tablet; Refill: 0  -     spironolactone (ALDACTONE) 25 MG tablet; Take 1 tablet (25 mg total) by mouth once daily.  Dispense: 90 tablet; Refill: 0    2. Heart failure, unspecified HF chronicity, unspecified heart failure type  -     carvediloL (COREG) 25 MG tablet; Take 1 tablet (25 mg total) by mouth 2 (two) times daily.  Dispense: 180 tablet; Refill: 0    3. Lumbar radiculopathy  -     gabapentin (NEURONTIN) 300 MG capsule; Take 1 capsule (300 mg total) by mouth 2 (two) times daily.  Dispense: 180 capsule; Refill: 0         Chronic conditions status updated as per HPI.  Other than changes above, cont current medications and maintain follow up with specialists.  Return to clinic in 3 months.    Patient Instructions   Change your diet. Lower the calories in your diet. Ask your dietitian  to help you set an eating plan that is right for you.  Get enough exercise. Talk to your doctor about the right amount of exercise for you.  Do not smoke.  Limit the amount of beer, wine, and mixed drinks (alcohol) you drink.  Keep a record of your weight. Write down what you eat and drink each day. This may help you be more aware of the choices you are making.       Dionne Stein, FNP-BC  [facility]

## 2024-05-17 LAB
OHS QRS DURATION: 94 MS
OHS QTC CALCULATION: 465 MS

## 2025-03-13 ENCOUNTER — HOSPITAL ENCOUNTER (EMERGENCY)
Facility: HOSPITAL | Age: 50
Discharge: HOME | End: 2025-03-13

## 2025-03-13 VITALS
RESPIRATION RATE: 18 BRPM | HEIGHT: 66 IN | HEART RATE: 102 BPM | WEIGHT: 240 LBS | DIASTOLIC BLOOD PRESSURE: 90 MMHG | OXYGEN SATURATION: 97 % | TEMPERATURE: 98 F | BODY MASS INDEX: 38.57 KG/M2 | SYSTOLIC BLOOD PRESSURE: 143 MMHG

## 2025-03-13 DIAGNOSIS — M54.40 CHRONIC LOW BACK PAIN WITH SCIATICA, SCIATICA LATERALITY UNSPECIFIED, UNSPECIFIED BACK PAIN LATERALITY: Primary | ICD-10-CM

## 2025-03-13 DIAGNOSIS — G89.29 CHRONIC LOW BACK PAIN WITH SCIATICA, SCIATICA LATERALITY UNSPECIFIED, UNSPECIFIED BACK PAIN LATERALITY: Primary | ICD-10-CM

## 2025-03-13 DIAGNOSIS — I10 PRIMARY HYPERTENSION: ICD-10-CM

## 2025-03-13 DIAGNOSIS — I10 UNCONTROLLED HYPERTENSION: ICD-10-CM

## 2025-03-13 DIAGNOSIS — M54.16 LUMBAR RADICULOPATHY: ICD-10-CM

## 2025-03-13 DIAGNOSIS — I50.9 HEART FAILURE, UNSPECIFIED HF CHRONICITY, UNSPECIFIED HEART FAILURE TYPE: ICD-10-CM

## 2025-03-13 PROCEDURE — 63600175 PHARM REV CODE 636 W HCPCS: Mod: JZ,TB

## 2025-03-13 PROCEDURE — 99284 EMERGENCY DEPT VISIT MOD MDM: CPT | Mod: 25

## 2025-03-13 PROCEDURE — 96372 THER/PROPH/DIAG INJ SC/IM: CPT

## 2025-03-13 RX ORDER — MELOXICAM 7.5 MG/1
7.5 TABLET ORAL DAILY
Qty: 30 TABLET | Refills: 0 | Status: SHIPPED | OUTPATIENT
Start: 2025-03-13 | End: 2025-03-13

## 2025-03-13 RX ORDER — MELOXICAM 7.5 MG/1
7.5 TABLET ORAL DAILY
Qty: 30 TABLET | Refills: 0 | Status: SHIPPED | OUTPATIENT
Start: 2025-03-13 | End: 2025-04-12

## 2025-03-13 RX ORDER — GABAPENTIN 300 MG/1
300 CAPSULE ORAL 2 TIMES DAILY
Qty: 60 CAPSULE | Refills: 0 | Status: SHIPPED | OUTPATIENT
Start: 2025-03-13 | End: 2025-04-12

## 2025-03-13 RX ORDER — DEXAMETHASONE SODIUM PHOSPHATE 4 MG/ML
4 INJECTION, SOLUTION INTRA-ARTICULAR; INTRALESIONAL; INTRAMUSCULAR; INTRAVENOUS; SOFT TISSUE
Status: COMPLETED | OUTPATIENT
Start: 2025-03-13 | End: 2025-03-13

## 2025-03-13 RX ORDER — AMLODIPINE BESYLATE 10 MG/1
10 TABLET ORAL DAILY
Qty: 30 TABLET | Refills: 0 | Status: SHIPPED | OUTPATIENT
Start: 2025-03-13 | End: 2025-03-13

## 2025-03-13 RX ORDER — KETOROLAC TROMETHAMINE 30 MG/ML
60 INJECTION, SOLUTION INTRAMUSCULAR; INTRAVENOUS
Status: COMPLETED | OUTPATIENT
Start: 2025-03-13 | End: 2025-03-13

## 2025-03-13 RX ORDER — POTASSIUM CHLORIDE 750 MG/1
10 TABLET, EXTENDED RELEASE ORAL DAILY
Qty: 30 TABLET | Refills: 0 | Status: SHIPPED | OUTPATIENT
Start: 2025-03-13 | End: 2025-03-13

## 2025-03-13 RX ORDER — LISINOPRIL 20 MG/1
20 TABLET ORAL DAILY
Qty: 30 TABLET | Refills: 0 | Status: SHIPPED | OUTPATIENT
Start: 2025-03-13 | End: 2025-03-13

## 2025-03-13 RX ORDER — POTASSIUM CHLORIDE 750 MG/1
10 TABLET, EXTENDED RELEASE ORAL DAILY
Qty: 30 TABLET | Refills: 0 | Status: SHIPPED | OUTPATIENT
Start: 2025-03-13 | End: 2025-04-12

## 2025-03-13 RX ORDER — SPIRONOLACTONE 25 MG/1
25 TABLET ORAL DAILY
Qty: 30 TABLET | Refills: 0 | Status: SHIPPED | OUTPATIENT
Start: 2025-03-13 | End: 2025-04-12

## 2025-03-13 RX ORDER — CARVEDILOL 25 MG/1
25 TABLET ORAL 2 TIMES DAILY
Qty: 60 TABLET | Refills: 0 | Status: SHIPPED | OUTPATIENT
Start: 2025-03-13 | End: 2025-03-13

## 2025-03-13 RX ORDER — HYDRALAZINE HYDROCHLORIDE 50 MG/1
50 TABLET, FILM COATED ORAL EVERY 12 HOURS
Qty: 60 TABLET | Refills: 0 | Status: SHIPPED | OUTPATIENT
Start: 2025-03-13 | End: 2025-03-13

## 2025-03-13 RX ORDER — FUROSEMIDE 20 MG/1
20 TABLET ORAL DAILY
Qty: 90 TABLET | Refills: 0 | Status: SHIPPED | OUTPATIENT
Start: 2025-03-13 | End: 2026-03-13

## 2025-03-13 RX ORDER — AMLODIPINE BESYLATE 10 MG/1
10 TABLET ORAL DAILY
Qty: 30 TABLET | Refills: 0 | Status: SHIPPED | OUTPATIENT
Start: 2025-03-13 | End: 2025-04-12

## 2025-03-13 RX ORDER — FUROSEMIDE 20 MG/1
20 TABLET ORAL DAILY
Qty: 90 TABLET | Refills: 0 | Status: SHIPPED | OUTPATIENT
Start: 2025-03-13 | End: 2025-03-13

## 2025-03-13 RX ORDER — GABAPENTIN 300 MG/1
300 CAPSULE ORAL 2 TIMES DAILY
Qty: 60 CAPSULE | Refills: 0 | Status: SHIPPED | OUTPATIENT
Start: 2025-03-13 | End: 2025-03-13

## 2025-03-13 RX ORDER — CARVEDILOL 25 MG/1
25 TABLET ORAL 2 TIMES DAILY
Qty: 60 TABLET | Refills: 0 | Status: SHIPPED | OUTPATIENT
Start: 2025-03-13 | End: 2025-04-12

## 2025-03-13 RX ORDER — HYDRALAZINE HYDROCHLORIDE 50 MG/1
50 TABLET, FILM COATED ORAL EVERY 12 HOURS
Qty: 60 TABLET | Refills: 0 | Status: SHIPPED | OUTPATIENT
Start: 2025-03-13 | End: 2025-04-12

## 2025-03-13 RX ORDER — SPIRONOLACTONE 25 MG/1
25 TABLET ORAL DAILY
Qty: 30 TABLET | Refills: 0 | Status: SHIPPED | OUTPATIENT
Start: 2025-03-13 | End: 2025-03-13

## 2025-03-13 RX ORDER — LISINOPRIL 20 MG/1
20 TABLET ORAL DAILY
Qty: 30 TABLET | Refills: 0 | Status: SHIPPED | OUTPATIENT
Start: 2025-03-13 | End: 2025-04-12

## 2025-03-13 RX ADMIN — KETOROLAC TROMETHAMINE 60 MG: 30 INJECTION, SOLUTION INTRAMUSCULAR at 06:03

## 2025-03-13 RX ADMIN — DEXAMETHASONE SODIUM PHOSPHATE 4 MG: 4 INJECTION, SOLUTION INTRA-ARTICULAR; INTRALESIONAL; INTRAMUSCULAR; INTRAVENOUS; SOFT TISSUE at 06:03

## 2025-03-13 NOTE — ED PROVIDER NOTES
Encounter Date: 3/13/2025       History     Chief Complaint   Patient presents with    Back Pain     SH is a 50 y/o AAM     Patient has a PMHx significant for Fatigue, HTN, CHF, and Chronic back pain.    Patient was recently released from group home, has to establish with a pcp. Pateint is out of his maintenance medications.         Review of patient's allergies indicates:   Allergen Reactions    Shrimp Swelling     Past Medical History:   Diagnosis Date    Fatigue     Hypertension     Shortness of breath     Sleep apnea, unspecified     Wheezing      Past Surgical History:   Procedure Laterality Date    ANGIOGRAM, CORONARY, WITH LEFT HEART CATHETERIZATION Left 4/21/2021    Procedure: Left heart cath w/ angiograms;  Surgeon: Toby Mcwilliams MD;  Location: [facility] CATH LAB;  Service: Cardiology;  Laterality: Left;    HAND SURGERY      per Dr Castillo     Family History   Problem Relation Name Age of Onset    Heart disease Mother      Heart disease Brother      Hypertension Other      Diabetes Other       Social History[1]  Review of Systems   Constitutional: Negative.    HENT: Negative.     Eyes: Negative.    Respiratory: Negative.     Cardiovascular: Negative.    Gastrointestinal: Negative.    Endocrine: Negative.    Genitourinary: Negative.    Musculoskeletal:  Positive for back pain.   Skin: Negative.    Allergic/Immunologic: Negative.    Neurological: Negative.    Hematological: Negative.    Psychiatric/Behavioral: Negative.         Physical Exam     Initial Vitals [03/13/25 1731]   BP Pulse Resp Temp SpO2   (!) 143/90 102 18 98.4 °F (36.9 °C) 97 %      MAP       --         Physical Exam    Nursing note and vitals reviewed.  Constitutional: Vital signs are normal. He appears well-developed and well-nourished. He is not diaphoretic. He is cooperative.  Non-toxic appearance. He does not have a sickly appearance. He does not appear ill. No distress.   Cardiovascular:  Normal rate, regular rhythm, S1 normal, S2 normal,  normal heart sounds, intact distal pulses and normal pulses.           Pulmonary/Chest: Effort normal and breath sounds normal.   Abdominal: Abdomen is soft and flat. Bowel sounds are normal. There is no abdominal tenderness. No hernia.   Musculoskeletal:      Lumbar back: Spasms and tenderness present. Positive right straight leg raise test and positive left straight leg raise test. Scoliosis present.     Neurological: He is alert and oriented to person, place, and time. He has normal strength and normal reflexes. He displays normal reflexes. No cranial nerve deficit or sensory deficit. He displays a negative Romberg sign. GCS eye subscore is 4. GCS verbal subscore is 5. GCS motor subscore is 6.   Skin: Skin is warm, dry and intact. Capillary refill takes less than 2 seconds. No rash noted.   Psychiatric: He has a normal mood and affect. His speech is normal and behavior is normal. Judgment and thought content normal. Cognition and memory are normal.         Medical Screening Exam   See Full Note    ED Course   Procedures  Labs Reviewed - No data to display       Imaging Results    None          Medications   ketorolac injection 60 mg (has no administration in time range)   dexAMETHasone injection 4 mg (has no administration in time range)     Medical Decision Making  SH is a 50 y/o AAM     Patient has a PMHx significant for Fatigue, HTN, CHF, and Chronic back pain.    Patient was recently released from group home, has to establish with a pcp. Pateint is out of his maintenance medications.           Amount and/or Complexity of Data Reviewed  Discussion of management or test interpretation with external provider(s): Medication refills x's 1    Risk  Prescription drug management.  Risk Details: Chronic Back pain   HTN                                      Clinical Impression:   Final diagnoses:  [M54.40, G89.29] Chronic low back pain with sciatica, sciatica laterality unspecified, unspecified back pain laterality  (Primary)  [I10] Primary hypertension        ED Disposition Condition    Discharge Stable          ED Prescriptions       Medication Sig Dispense Start Date End Date Auth. Provider    amLODIPine (NORVASC) 10 MG tablet Take 1 tablet (10 mg total) by mouth once daily. 30 tablet 3/13/2025 4/12/2025 Jabari Medley FNP    furosemide (LASIX) 20 MG tablet Take 1 tablet (20 mg total) by mouth once daily. 90 tablet 3/13/2025 3/13/2026 Jabari Medley FNP    carvediloL (COREG) 25 MG tablet Take 1 tablet (25 mg total) by mouth 2 (two) times daily. 60 tablet 3/13/2025 4/12/2025 Jabari Medley FNP    gabapentin (NEURONTIN) 300 MG capsule Take 1 capsule (300 mg total) by mouth 2 (two) times daily. 60 capsule 3/13/2025 4/12/2025 Jabari Medley FNP    hydrALAZINE (APRESOLINE) 50 MG tablet Take 1 tablet (50 mg total) by mouth every 12 (twelve) hours. 60 tablet 3/13/2025 4/12/2025 Jabari Medley FNP    lisinopriL (PRINIVIL,ZESTRIL) 20 MG tablet Take 1 tablet (20 mg total) by mouth once daily. 30 tablet 3/13/2025 4/12/2025 Jabari Medley FNP    meloxicam (MOBIC) 7.5 MG tablet Take 1 tablet (7.5 mg total) by mouth once daily. 30 tablet 3/13/2025 4/12/2025 Jabari Medley FNP    potassium chloride (KLOR-CON) 10 MEQ TbSR Take 1 tablet (10 mEq total) by mouth once daily. 30 tablet 3/13/2025 4/12/2025 Jabari Medley FNP    spironolactone (ALDACTONE) 25 MG tablet Take 1 tablet (25 mg total) by mouth once daily. 30 tablet 3/13/2025 4/12/2025 Jabari Medley FNP          Follow-up Information    None            [1]   Social History  Tobacco Use    Smoking status: Every Day     Types: Cigarettes    Smokeless tobacco: Never    Tobacco comments:     quit 2 weeks ago when he became sob   Substance Use Topics    Alcohol use: Not Currently     Alcohol/week: 1.0 standard drink of alcohol     Types: 1 Cans of beer per week     Comment: occasionally    Drug use: Yes     Types: Marijuana        Jabari Medley FNP  03/13/25  1823

## 2025-05-07 ENCOUNTER — HOSPITAL ENCOUNTER (EMERGENCY)
Facility: HOSPITAL | Age: 50
Discharge: HOME | End: 2025-05-07

## 2025-05-07 VITALS
BODY MASS INDEX: 42.43 KG/M2 | WEIGHT: 264 LBS | RESPIRATION RATE: 24 BRPM | DIASTOLIC BLOOD PRESSURE: 113 MMHG | SYSTOLIC BLOOD PRESSURE: 212 MMHG | HEIGHT: 66 IN | TEMPERATURE: 98 F | OXYGEN SATURATION: 96 % | HEART RATE: 92 BPM

## 2025-05-07 DIAGNOSIS — R51.9 NONINTRACTABLE HEADACHE, UNSPECIFIED CHRONICITY PATTERN, UNSPECIFIED HEADACHE TYPE: Primary | ICD-10-CM

## 2025-05-07 DIAGNOSIS — I10 HTN (HYPERTENSION): ICD-10-CM

## 2025-05-07 LAB
ANION GAP SERPL CALCULATED.3IONS-SCNC: 12 MMOL/L (ref 7–16)
ANISOCYTOSIS BLD QL SMEAR: ABNORMAL
BASOPHILS # BLD AUTO: 0.09 K/UL (ref 0–0.2)
BASOPHILS NFR BLD AUTO: 0.9 % (ref 0–1)
BUN SERPL-MCNC: 9 MG/DL (ref 8–26)
BUN/CREAT SERPL: 9 (ref 6–20)
CALCIUM SERPL-MCNC: 8.8 MG/DL (ref 8.4–10.2)
CHLORIDE SERPL-SCNC: 106 MMOL/L (ref 98–107)
CO2 SERPL-SCNC: 25 MMOL/L (ref 22–29)
CREAT SERPL-MCNC: 1.03 MG/DL (ref 0.72–1.25)
DIFFERENTIAL METHOD BLD: ABNORMAL
EGFR (NO RACE VARIABLE) (RUSH/TITUS): 88 ML/MIN/1.73M2
EOSINOPHIL # BLD AUTO: 0.32 K/UL (ref 0–0.5)
EOSINOPHIL NFR BLD AUTO: 3.2 % (ref 1–4)
ERYTHROCYTE [DISTWIDTH] IN BLOOD BY AUTOMATED COUNT: 15.2 % (ref 11.5–14.5)
GLUCOSE SERPL-MCNC: 94 MG/DL (ref 74–100)
HCT VFR BLD AUTO: 41.7 % (ref 40–54)
HGB BLD-MCNC: 13.5 G/DL (ref 13.5–18)
IMM GRANULOCYTES # BLD AUTO: 0.08 K/UL (ref 0–0.04)
IMM GRANULOCYTES NFR BLD: 0.8 % (ref 0–0.4)
LYMPHOCYTES # BLD AUTO: 2.63 K/UL (ref 1–4.8)
LYMPHOCYTES NFR BLD AUTO: 26.6 % (ref 27–41)
MCH RBC QN AUTO: 28.2 PG (ref 27–31)
MCHC RBC AUTO-ENTMCNC: 32.4 G/DL (ref 32–36)
MCV RBC AUTO: 87.2 FL (ref 80–96)
MONOCYTES # BLD AUTO: 0.89 K/UL (ref 0–0.8)
MONOCYTES NFR BLD AUTO: 9 % (ref 2–6)
MPC BLD CALC-MCNC: 12.7 FL (ref 9.4–12.4)
NEUTROPHILS # BLD AUTO: 5.88 K/UL (ref 1.8–7.7)
NEUTROPHILS NFR BLD AUTO: 59.5 % (ref 53–65)
NRBC # BLD AUTO: 0 X10E3/UL
NRBC, AUTO (.00): 0 %
OHS QRS DURATION: 98 MS
OHS QTC CALCULATION: 445 MS
PLATELET # BLD AUTO: 229 K/UL (ref 150–400)
PLATELET MORPHOLOGY: ABNORMAL
POTASSIUM SERPL-SCNC: 3.7 MMOL/L (ref 3.5–5.1)
RBC # BLD AUTO: 4.78 M/UL (ref 4.6–6.2)
SODIUM SERPL-SCNC: 139 MMOL/L (ref 136–145)
WBC # BLD AUTO: 9.89 K/UL (ref 4.5–11)

## 2025-05-07 PROCEDURE — 96372 THER/PROPH/DIAG INJ SC/IM: CPT | Performed by: NURSE PRACTITIONER

## 2025-05-07 PROCEDURE — 80048 BASIC METABOLIC PNL TOTAL CA: CPT | Performed by: NURSE PRACTITIONER

## 2025-05-07 PROCEDURE — 99285 EMERGENCY DEPT VISIT HI MDM: CPT | Mod: 25

## 2025-05-07 PROCEDURE — 85025 COMPLETE CBC W/AUTO DIFF WBC: CPT | Performed by: NURSE PRACTITIONER

## 2025-05-07 PROCEDURE — 93005 ELECTROCARDIOGRAM TRACING: CPT

## 2025-05-07 PROCEDURE — 63600175 PHARM REV CODE 636 W HCPCS: Performed by: NURSE PRACTITIONER

## 2025-05-07 PROCEDURE — 93010 ELECTROCARDIOGRAM REPORT: CPT | Mod: ,,, | Performed by: INTERNAL MEDICINE

## 2025-05-07 RX ORDER — HYDRALAZINE HYDROCHLORIDE 20 MG/ML
10 INJECTION INTRAMUSCULAR; INTRAVENOUS
Status: COMPLETED | OUTPATIENT
Start: 2025-05-07 | End: 2025-05-07

## 2025-05-07 RX ORDER — FUROSEMIDE 20 MG/1
20 TABLET ORAL DAILY
Qty: 30 TABLET | Refills: 0 | Status: SHIPPED | OUTPATIENT
Start: 2025-05-07 | End: 2026-05-07

## 2025-05-07 RX ORDER — HYDRALAZINE HYDROCHLORIDE 50 MG/1
50 TABLET, FILM COATED ORAL EVERY 12 HOURS
Qty: 60 TABLET | Refills: 0 | Status: SHIPPED | OUTPATIENT
Start: 2025-05-07 | End: 2025-06-06

## 2025-05-07 RX ORDER — ACETAMINOPHEN 500 MG
1000 TABLET ORAL
Status: DISCONTINUED | OUTPATIENT
Start: 2025-05-07 | End: 2025-05-07 | Stop reason: HOSPADM

## 2025-05-07 RX ADMIN — HYDRALAZINE HYDROCHLORIDE 10 MG: 20 INJECTION, SOLUTION INTRAMUSCULAR; INTRAVENOUS at 02:05

## 2025-05-07 NOTE — ED PROVIDER NOTES
Encounter Date: 5/7/2025       History     Chief Complaint   Patient presents with    Headache     PT C/O GENERALIZED HEADACHE, HASN'T BEEN TAKING BP MEDS. ALSO C/O FEET SWELLING.    Hip Pain     PT C/O BILATERAL HIP JOINT PAIN AND LOW BACK PAIN.     Hypertension     50-year-old male presents to the emergency department to be evaluated for headache and elevated blood pressure reading.  He has been out of his blood pressure medicine for the past 3 months.  Denies any chest pain or shortness of breath.  He also has chronic low back pain.  He reports that he fell off of the horse when he was a child and has had chronic back pain since then.  Denies any numbness or weakness in his lower extremities, loss of: Trial of bowels or bladder, dysuria, fever, chills.    The history is provided by the patient.     Review of patient's allergies indicates:   Allergen Reactions    Shrimp Swelling     Past Medical History:   Diagnosis Date    CHF (congestive heart failure)     Fatigue     Hypertension     Shortness of breath     Sleep apnea, unspecified     Wheezing      Past Surgical History:   Procedure Laterality Date    ANGIOGRAM, CORONARY, WITH LEFT HEART CATHETERIZATION Left 4/21/2021    Procedure: Left heart cath w/ angiograms;  Surgeon: Toby Mcwilliams MD;  Location: [facility] CATH LAB;  Service: Cardiology;  Laterality: Left;    HAND SURGERY      per Dr Castillo     Family History   Problem Relation Name Age of Onset    Heart disease Mother      Heart disease Brother      Hypertension Other      Diabetes Other       Social History[1]  Review of Systems   Constitutional:  Negative for chills and fever.   Respiratory:  Negative for chest tightness and shortness of breath.    Cardiovascular:  Negative for chest pain and leg swelling.   Musculoskeletal:  Positive for back pain.   Neurological:  Positive for headaches.   All other systems reviewed and are negative.      Physical Exam     Initial Vitals   BP Pulse Resp Temp SpO2    05/07/25 1130 05/07/25 1128 05/07/25 1128 05/07/25 1130 05/07/25 1128   (!) 210/115 97 18 98.2 °F (36.8 °C) 96 %      MAP       --                Physical Exam    Vitals reviewed.  Constitutional: He appears well-developed and well-nourished.   Neck: Neck supple.   Cardiovascular:  Normal rate and regular rhythm.           Pulmonary/Chest: Breath sounds normal.   Abdominal: Abdomen is soft. Bowel sounds are normal. He exhibits no distension and no mass. There is no abdominal tenderness. There is no rebound and no guarding.   Musculoskeletal:         General: Edema (Trace bilateral lower extremity edema) present. No tenderness. Normal range of motion.      Cervical back: Neck supple.     Neurological: He is alert and oriented to person, place, and time. He has normal strength. GCS score is 15. GCS eye subscore is 4. GCS verbal subscore is 5. GCS motor subscore is 6.   Skin: Skin is warm and dry. Capillary refill takes less than 2 seconds.   Psychiatric: He has a normal mood and affect.         Medical Screening Exam   See Full Note    ED Course   Procedures  Labs Reviewed   CBC WITH DIFFERENTIAL - Abnormal       Result Value    WBC 9.89      RBC 4.78      Hemoglobin 13.5      Hematocrit 41.7      MCV 87.2      MCH 28.2      MCHC 32.4      RDW 15.2 (*)     Platelet Count 229      MPV 12.7 (*)     Neutrophils % 59.5      Lymphocytes % 26.6 (*)     Monocytes % 9.0 (*)     Eosinophils % 3.2      Basophils % 0.9      Immature Granulocytes % 0.8 (*)     nRBC, Auto 0.0      Neutrophils, Abs 5.88      Lymphocytes, Absolute 2.63      Monocytes, Absolute 0.89 (*)     Eosinophils, Absolute 0.32      Basophils, Absolute 0.09      Immature Granulocytes, Absolute 0.08 (*)     nRBC, Absolute 0.00      Diff Type Scan Smear      Narrative:     Specimen contains platelet clumps and fibrin.  The platelet count represents the minimum number of free platelets.   CBC MORPHOLOGY - Abnormal    Platelet Morphology Few Large Platelets (*)      Anisocytosis 1+     BASIC METABOLIC PANEL - Normal    Sodium 139      Potassium 3.7      Chloride 106      CO2 25      Anion Gap 12      Glucose 94      BUN 9      Creatinine 1.03      BUN/Creatinine Ratio 9      Calcium 8.8      eGFR 88     CBC W/ AUTO DIFFERENTIAL    Narrative:     The following orders were created for panel order CBC auto differential.  Procedure                               Abnormality         Status                     ---------                               -----------         ------                     CBC with Differential[[id number]]       Abnormal            Final result                 Please view results for these tests on the individual orders.          Imaging Results              CT Head Without Contrast (Final result)  Result time 05/07/25 12:46:07      Final result by Alex Mckenna MD (05/07/25 12:46:07)                   Impression:      1. Allowing for motion artifact, no convincing acute intracranial abnormalities.      Electronically signed by: Alex Mckenna MD  Date:    05/07/2025  Time:    12:46               Narrative:    EXAMINATION:  CT HEAD WITHOUT CONTRAST    CLINICAL HISTORY:  Headache, chronic, new features or increased frequency;    TECHNIQUE:  Low dose axial images were obtained through the head.  Coronal and sagittal reformations were also performed. Contrast was not administered.    COMPARISON:  None.    FINDINGS:  There is no evidence of acute major vascular territory infarct, hemorrhage, or mass.  There is no hydrocephalus.  There are no abnormal extra-axial fluid collections.  The paranasal sinuses and mastoid air cells are clear, and there is no evidence of calvarial fracture.  The visualized soft tissues are unremarkable.                                       Medications   acetaminophen tablet 1,000 mg (has no administration in time range)   hydrALAZINE injection 10 mg (10 mg Intramuscular Given 5/7/25 8959)     Medical Decision Making  50-year-old  male presents to the emergency department to be evaluated for headache and elevated blood pressure reading.  He has been out of his blood pressure medicine for the past 3 months.  Denies any chest pain or shortness of breath.  He also has chronic low back pain.  He reports that he fell off of the horse when he was a child and has had chronic back pain since then.  Denies any numbness or weakness in his lower extremities, loss of: Trial of bowels or bladder, dysuria, fever, chills.  Labs ordered and reviewed  Treated with hydralazine 10 mg IM and Tylenol p.o.  CT ordered, reviewed as well as radiologist's interpretation significant for no acute process   I printed the patient an application for the [facility] and discussed with him the importance of follow-up.  He voiced understanding  Diagnosis: Hypertension, headache    Amount and/or Complexity of Data Reviewed  Labs: ordered.  Radiology: ordered.    Risk  OTC drugs.  Prescription drug management.                                      Clinical Impression:   Final diagnoses:  [I10] HTN (hypertension)  [R51.9] Nonintractable headache, unspecified chronicity pattern, unspecified headache type (Primary)                   [1]   Social History  Tobacco Use    Smoking status: Every Day     Types: Cigarettes    Smokeless tobacco: Never    Tobacco comments:     quit 2 weeks ago when he became sob   Substance Use Topics    Alcohol use: Not Currently     Alcohol/week: 1.0 standard drink of alcohol     Types: 1 Cans of beer per week     Comment: occasionally    Drug use: Yes     Types: Marijuana     Comment: unknown        Katlin Gayle, [facility]  05/07/25 2638

## 2025-05-07 NOTE — NURSING
Pt complains of HA, bilateral hip pain. Released from longterm in Feb 2025, per patient he was given medicare papers but doesn't know how to fill them out and can't find a job. Dx with HTN and CHF in longterm

## 2025-05-07 NOTE — DISCHARGE INSTRUCTIONS
Fill out the application for the [facility] and take it there today.  Follow-up at the free clinic.  It is important that you take your medications as directed. Follow up with your primary care provider in 2 days. Return to the emergency department for any increase in symptoms or for any other new or worrisome symptoms.

## 2025-05-19 NOTE — PROGRESS NOTES
Population Health Chart Review & Patient Outreach Details      Further Action Needed If Patient Returns Outreach:      [facility] [place] and [facility] [place]      Updates Requested / Reviewed:     [x]  Care Everywhere    [x]     []  External Sources ([facility], [facility], [facility], etc.)    [] [facility]   [] [facility]   [] Other:    [x]  Care Team Updated   []  Removed  or Duplicate Orders   [x]  Immunization Reconciliation Completed / Queried    [x] [place]   [] [place]   [] [place]   [] [place]      Health Maintenance Topics Addressed and Outreach Outcomes / Actions Taken:             Breast Cancer Screening []  Mammogram Order Placed    []  Mammogram Screening Scheduled    []  External Records Requested & Care Team Updated if Applicable    []  External Records Uploaded & Care Team Updated if Applicable    []  Pt Declined Scheduling Mammogram    []  Pt Will Schedule with External Provider / Order Routed & Care Team Updated if Applicable              Cervical Cancer Screening []  Pap Smear Scheduled in Primary Care or OBGYN    []  External Records Requested & Care Team Updated if Applicable       []  External Records Uploaded, Care Team Updated, & History Updated if Applicable    []  Patient Declined Scheduling Pap Smear    []  Patient Will Schedule with External Provider & Care Team Updated if Applicable                  Colorectal Cancer Screening []  Colonoscopy Case Request / Referral / Home Test Order Placed    []  External Records Requested & Care Team Updated if Applicable    []  External Records Uploaded, Care Team Updated, & History Updated if Applicable    []  Patient Declined Completing Colon Cancer Screening    []  Patient Will Schedule with External Provider & Care Team Updated if Applicable    []  Fit Kit Mailed (add the SmartPhrase under additional notes)    []  Reminded Patient to Complete Home Test                Diabetic Eye Exam []  Eye Exam Screening  Order Placed    []  Eye Camera Scheduled or Optometry/Ophthalmology Referral Placed    []  External Records Requested & Care Team Updated if Applicable    []  External Records Uploaded, Care Team Updated, & History Updated if Applicable    []  Patient Declined Scheduling Eye Exam    []  Patient Will Schedule with External Provider & Care Team Updated if Applicable             Blood Pressure Control []  Primary Care Follow Up Visit Scheduled     []  Remote Blood Pressure Reading Captured    []  Patient Declined Remote Reading or Scheduling Appt - Escalated to PCP    []  Patient Will Call Back or Send Portal Message with Reading                 HbA1c & Other Labs []  Overdue Lab(s) Ordered    []  Overdue Lab(s) Scheduled    []  External Records Uploaded & Care Team Updated if Applicable    []  Primary Care Follow Up Visit Scheduled     []  Reminded Patient to Complete A1c Home Test    []  Patient Declined Scheduling Labs or Will Call Back to Schedule    []  Patient Will Schedule with External Provider / Order Routed, & Care Team Updated if Applicable           Primary Care Appointment []  Primary Care Appt Scheduled    []  Patient Declined Scheduling or Will Call Back to Schedule    []  Pt Established with External Provider, Updated Care Team, & Informed Pt to Notify Payor if Applicable           Medication Adherence /    Statin Use []  Primary Care Appointment Scheduled    []  Patient Reminded to  Prescription    []  Patient Declined, Provider Notified if Needed    []  Sent Provider Message to Review to Evaluate Pt for Statin, Add Exclusion Dx Codes, Document   Exclusion in Problem List, Change Statin Intensity Level to Moderate or High Intensity if Applicable                Osteoporosis Screening []  Dexa Order Placed    []  Dexa Appointment Scheduled    []  External Records Requested & Care Team Updated    []  External Records Uploaded, Care Team Updated, & History Updated if Applicable    []  Patient  Declined Scheduling Dexa or Will Call Back to Schedule    []  Patient Will Schedule with External Provider / Order Routed & Care Team Updated if Applicable       Additional Notes:            .  - dyspnea on exertion, denies orthopnea  - Pro BNP 50K  - dry weight 135, today 138 in office   - ? missed dialysis last week, states had two days in a row to make up for missed session, can not recall days  - Dietary indiscretions over weekend  - takes torsemide at home for Lower extremity edema, has not taken in few days.   - did not take medications this am secondary to nausea.   - cont home medications, hydralazine, imdur, coreg, torsemide   - cxr with pulmonary congestion  - likely need HD for fluif removal, nephrology consult

## (undated) DEVICE — TUBING CAPNOLINE PLUS SMART 02 CONNECTOR(ORDER 65110)

## (undated) DEVICE — GUIDEWIRE EXCHANGE J TIP .035X260CM

## (undated) DEVICE — TOWEL OR STERILE BLUE 4/PK 20PK/CS

## (undated) DEVICE — KIT ANGIO MANIFOLD CUSTOM RFH LEFT HEART KIT

## (undated) DEVICE — CATH IMPULSE 6FR PIGTAIL

## (undated) DEVICE — APPLICATOR CHLORAPREP LITE ORANGE 10.5ML STERILE

## (undated) DEVICE — SET IV PRIMARY ALARIS (PRIMARY)

## (undated) DEVICE — POSITIONER ULNAR NERVE

## (undated) DEVICE — CDS ANGIOGRAPHY PACK

## (undated) DEVICE — DEVICE RADIAL TR BAND REG

## (undated) DEVICE — CATH DIAG OPTITORQUE 6.5FR JACKY 3.5

## (undated) DEVICE — GLIDESHEATH SLENDER INTRODUCER 6FR

## (undated) DEVICE — BAG-A-JET FLUID DISPENSER SYSTEM

## (undated) DEVICE — DRESSING IV TEGADERM 10X12CM

## (undated) DEVICE — SENSOR PULSE OX ADULT

## (undated) DEVICE — DRAPE BRACHIAL ANGIOGRAPHY TIBURON

## (undated) DEVICE — STOPCOCK 3-WAY ROTATING

## (undated) DEVICE — GLOVE SURG BIOGEL SZ 7.5

## (undated) DEVICE — ISOVUE 370 100ML

## (undated) DEVICE — SET IV EXTENSION 42IN W/2 PORT CLEARLINK

## (undated) DEVICE — GLOVE SURGICAL PROTEXIS PI SIZE 6